# Patient Record
Sex: MALE | Race: WHITE | Employment: FULL TIME | URBAN - NONMETROPOLITAN AREA
[De-identification: names, ages, dates, MRNs, and addresses within clinical notes are randomized per-mention and may not be internally consistent; named-entity substitution may affect disease eponyms.]

---

## 2017-02-06 ENCOUNTER — OFFICE VISIT (OUTPATIENT)
Dept: UROLOGY | Age: 42
End: 2017-02-06

## 2017-02-06 VITALS
SYSTOLIC BLOOD PRESSURE: 110 MMHG | WEIGHT: 255 LBS | HEIGHT: 71 IN | DIASTOLIC BLOOD PRESSURE: 80 MMHG | BODY MASS INDEX: 35.7 KG/M2

## 2017-02-06 DIAGNOSIS — R10.9 FLANK PAIN: ICD-10-CM

## 2017-02-06 DIAGNOSIS — N20.1 URETERAL CALCULI: Primary | ICD-10-CM

## 2017-02-06 LAB
BILIRUBIN URINE: NEGATIVE
BLOOD URINE, POC: NORMAL
CHARACTER, URINE: CLEAR
COLOR, URINE: YELLOW
GLUCOSE URINE: NEGATIVE MG/DL
KETONES, URINE: NEGATIVE
LEUKOCYTE CLUMPS, URINE: NORMAL
NITRITE, URINE: NEGATIVE
PH, URINE: 5.5
PROTEIN, URINE: NEGATIVE MG/DL
SPECIFIC GRAVITY, URINE: 1.01 (ref 1–1.03)
UROBILINOGEN, URINE: 0.2 EU/DL

## 2017-02-06 PROCEDURE — 81003 URINALYSIS AUTO W/O SCOPE: CPT | Performed by: NURSE PRACTITIONER

## 2017-02-06 PROCEDURE — 99204 OFFICE O/P NEW MOD 45 MIN: CPT | Performed by: NURSE PRACTITIONER

## 2017-02-06 RX ORDER — TAMSULOSIN HYDROCHLORIDE 0.4 MG/1
0.4 CAPSULE ORAL 2 TIMES DAILY
Qty: 20 CAPSULE | Refills: 1 | Status: SHIPPED | OUTPATIENT
Start: 2017-02-06 | End: 2017-08-07 | Stop reason: ALTCHOICE

## 2017-02-06 ASSESSMENT — ENCOUNTER SYMPTOMS
CONSTIPATION: 0
SHORTNESS OF BREATH: 0
COLOR CHANGE: 1
EYE ITCHING: 0
BACK PAIN: 0
CHEST TIGHTNESS: 0
EYE REDNESS: 0
ABDOMINAL PAIN: 1

## 2017-02-09 ENCOUNTER — TELEPHONE (OUTPATIENT)
Dept: UROLOGY | Age: 42
End: 2017-02-09

## 2017-02-15 ENCOUNTER — TELEPHONE (OUTPATIENT)
Dept: UROLOGY | Age: 42
End: 2017-02-15

## 2017-02-24 LAB — STONE ANALYSIS: NORMAL

## 2017-04-12 RX ORDER — ESOMEPRAZOLE MAGNESIUM 40 MG/1
40 CAPSULE, DELAYED RELEASE ORAL DAILY
Qty: 30 CAPSULE | Refills: 11 | Status: SHIPPED | OUTPATIENT
Start: 2017-04-12 | End: 2018-05-03 | Stop reason: SDUPTHER

## 2017-08-07 ENCOUNTER — HOSPITAL ENCOUNTER (OUTPATIENT)
Age: 42
Discharge: HOME | End: 2017-08-07
Payer: COMMERCIAL

## 2017-08-07 ENCOUNTER — OFFICE VISIT (OUTPATIENT)
Dept: FAMILY MEDICINE CLINIC | Age: 42
End: 2017-08-07
Payer: COMMERCIAL

## 2017-08-07 VITALS
HEART RATE: 88 BPM | OXYGEN SATURATION: 97 % | TEMPERATURE: 98.5 F | WEIGHT: 269 LBS | RESPIRATION RATE: 20 BRPM | SYSTOLIC BLOOD PRESSURE: 130 MMHG | HEIGHT: 71 IN | DIASTOLIC BLOOD PRESSURE: 76 MMHG | BODY MASS INDEX: 37.66 KG/M2

## 2017-08-07 DIAGNOSIS — R50.9 FEBRILE ILLNESS: ICD-10-CM

## 2017-08-07 DIAGNOSIS — R53.83 FATIGUE, UNSPECIFIED TYPE: Primary | ICD-10-CM

## 2017-08-07 LAB
ALBUMIN SERPL-MCNC: 3.9 G/DL (ref 3.5–5.1)
ALP BLD-CCNC: 113 U/L (ref 38–126)
ALT SERPL-CCNC: 57 U/L (ref 11–66)
AMORPHOUS: ABNORMAL
ANION GAP SERPL CALCULATED.3IONS-SCNC: 14 MEQ/L (ref 8–16)
AST SERPL-CCNC: 45 U/L (ref 5–40)
BACTERIA: ABNORMAL
BASOPHILS # BLD: 1 %
BASOPHILS ABSOLUTE: 0 THOU/MM3 (ref 0–0.1)
BILIRUB SERPL-MCNC: 0.8 MG/DL (ref 0.3–1.2)
BILIRUBIN CONFIRMATION: NEGATIVE
BILIRUBIN URINE: ABNORMAL
BLOOD, URINE: NEGATIVE
BUN BLDV-MCNC: 10 MG/DL (ref 7–22)
CALCIUM SERPL-MCNC: 8.8 MG/DL (ref 8.5–10.5)
CASTS UA: ABNORMAL /LPF
CHARACTER, URINE: CLEAR
CHLORIDE BLD-SCNC: 103 MEQ/L (ref 98–111)
CO2: 26 MEQ/L (ref 23–33)
COLOR: ABNORMAL
CREAT SERPL-MCNC: 0.9 MG/DL (ref 0.4–1.2)
CRYSTALS, UA: ABNORMAL
EOSINOPHIL # BLD: 1.6 %
EOSINOPHILS ABSOLUTE: 0 THOU/MM3 (ref 0–0.4)
EPITHELIAL CELLS, UA: ABNORMAL /HPF
GFR SERPL CREATININE-BSD FRML MDRD: > 90 ML/MIN/1.73M2
GLUCOSE BLD-MCNC: 160 MG/DL (ref 70–108)
GLUCOSE, URINE: NEGATIVE MG/DL
HCT VFR BLD CALC: 41.1 % (ref 42–52)
HEMOGLOBIN: 14.2 GM/DL (ref 14–18)
KETONES, URINE: ABNORMAL
LEUKOCYTE ESTERASE, URINE: NEGATIVE
LYMPHOCYTES # BLD: 23.8 %
LYMPHOCYTES ABSOLUTE: 0.7 THOU/MM3 (ref 1–4.8)
MCH RBC QN AUTO: 28.3 PG (ref 27–31)
MCHC RBC AUTO-ENTMCNC: 34.6 GM/DL (ref 33–37)
MCV RBC AUTO: 81.8 FL (ref 80–94)
MONOCYTES # BLD: 12.4 %
MONOCYTES ABSOLUTE: 0.4 THOU/MM3 (ref 0.4–1.3)
MUCUS: ABNORMAL
NITRITE, URINE: NEGATIVE
NUCLEATED RED BLOOD CELLS: 0 /100 WBC
PDW BLD-RTO: 13.4 % (ref 11.5–14.5)
PH UA: 5.5 (ref 5–9)
PLATELET # BLD: 134 THOU/MM3 (ref 130–400)
PMV BLD AUTO: 9.7 MCM (ref 7.4–10.4)
POTASSIUM SERPL-SCNC: 3.7 MEQ/L (ref 3.5–5.2)
PROTEIN UA: 30 MG/DL
RBC # BLD: 5.02 MILL/MM3 (ref 4.7–6.1)
RBC # BLD: NORMAL 10*6/UL
RBC URINE: ABNORMAL /HPF
SEG NEUTROPHILS: 61.2 %
SEGMENTED NEUTROPHILS ABSOLUTE COUNT: 1.9 THOU/MM3 (ref 1.8–7.7)
SODIUM BLD-SCNC: 143 MEQ/L (ref 135–145)
SPECIFIC GRAVITY UA: >= 1.03 (ref 1–1.03)
TOTAL PROTEIN: 6.1 G/DL (ref 6.1–8)
TSH SERPL DL<=0.05 MIU/L-ACNC: 2.2 UIU/ML (ref 0.4–4.2)
UROBILINOGEN, URINE: 1 EU/DL (ref 0.2–1)
WBC # BLD: 3.1 THOU/MM3 (ref 4.8–10.8)
WBC UA: ABNORMAL /HPF

## 2017-08-07 PROCEDURE — 80050 GENERAL HEALTH PANEL: CPT

## 2017-08-07 PROCEDURE — 81001 URINALYSIS AUTO W/SCOPE: CPT

## 2017-08-07 PROCEDURE — 36415 COLL VENOUS BLD VENIPUNCTURE: CPT

## 2017-08-07 PROCEDURE — 99213 OFFICE O/P EST LOW 20 MIN: CPT | Performed by: NURSE PRACTITIONER

## 2017-08-08 ASSESSMENT — ENCOUNTER SYMPTOMS
CHEST TIGHTNESS: 0
ABDOMINAL PAIN: 0
SHORTNESS OF BREATH: 0
SORE THROAT: 0
COUGH: 0
NAUSEA: 0
WHEEZING: 0
CONSTIPATION: 0
ABDOMINAL DISTENTION: 0
BACK PAIN: 0
STRIDOR: 0
SINUS PRESSURE: 0
VOMITING: 0
COLOR CHANGE: 0
DIARRHEA: 1

## 2017-08-16 ENCOUNTER — OFFICE VISIT (OUTPATIENT)
Dept: FAMILY MEDICINE CLINIC | Age: 42
End: 2017-08-16
Payer: COMMERCIAL

## 2017-08-16 VITALS
WEIGHT: 270.6 LBS | RESPIRATION RATE: 12 BRPM | DIASTOLIC BLOOD PRESSURE: 88 MMHG | TEMPERATURE: 97.7 F | SYSTOLIC BLOOD PRESSURE: 124 MMHG | BODY MASS INDEX: 37.88 KG/M2 | HEIGHT: 71 IN

## 2017-08-16 DIAGNOSIS — L29.9 PRURITIC DERMATITIS: Primary | ICD-10-CM

## 2017-08-16 PROCEDURE — 99213 OFFICE O/P EST LOW 20 MIN: CPT | Performed by: FAMILY MEDICINE

## 2017-08-16 RX ORDER — HYDROXYZINE HYDROCHLORIDE 25 MG/1
25 TABLET, FILM COATED ORAL EVERY 6 HOURS PRN
Qty: 30 TABLET | Refills: 0 | Status: SHIPPED | OUTPATIENT
Start: 2017-08-16 | End: 2017-08-26

## 2017-08-16 RX ORDER — TRIAMCINOLONE ACETONIDE 40 MG/ML
80 INJECTION, SUSPENSION INTRA-ARTICULAR; INTRAMUSCULAR ONCE
Status: COMPLETED | OUTPATIENT
Start: 2017-08-16 | End: 2017-08-16

## 2017-08-16 RX ADMIN — TRIAMCINOLONE ACETONIDE 80 MG: 40 INJECTION, SUSPENSION INTRA-ARTICULAR; INTRAMUSCULAR at 09:49

## 2017-08-16 ASSESSMENT — ENCOUNTER SYMPTOMS
SHORTNESS OF BREATH: 0
RHINORRHEA: 0
ROS SKIN COMMENTS: ITCHING
COUGH: 0
SORE THROAT: 0
WHEEZING: 0
EYE DISCHARGE: 0
CONSTIPATION: 0
NAUSEA: 0
ABDOMINAL PAIN: 0
DIARRHEA: 0

## 2018-01-02 ENCOUNTER — OFFICE VISIT (OUTPATIENT)
Dept: FAMILY MEDICINE CLINIC | Age: 43
End: 2018-01-02
Payer: COMMERCIAL

## 2018-01-02 VITALS
OXYGEN SATURATION: 98 % | HEIGHT: 71 IN | WEIGHT: 276 LBS | SYSTOLIC BLOOD PRESSURE: 124 MMHG | HEART RATE: 78 BPM | RESPIRATION RATE: 20 BRPM | BODY MASS INDEX: 38.64 KG/M2 | DIASTOLIC BLOOD PRESSURE: 88 MMHG

## 2018-01-02 DIAGNOSIS — L30.9 DERMATITIS: Primary | ICD-10-CM

## 2018-01-02 DIAGNOSIS — N52.9 VASCULOGENIC ERECTILE DYSFUNCTION, UNSPECIFIED VASCULOGENIC ERECTILE DYSFUNCTION TYPE: ICD-10-CM

## 2018-01-02 DIAGNOSIS — B07.8 OTHER VIRAL WARTS: ICD-10-CM

## 2018-01-02 PROCEDURE — 99213 OFFICE O/P EST LOW 20 MIN: CPT | Performed by: NURSE PRACTITIONER

## 2018-01-02 RX ORDER — TADALAFIL 20 MG/1
20 TABLET ORAL PRN
Qty: 10 TABLET | Refills: 1 | Status: SHIPPED | OUTPATIENT
Start: 2018-01-02 | End: 2020-05-28

## 2018-01-02 RX ORDER — MOMETASONE FUROATE 1 MG/G
CREAM TOPICAL
Qty: 45 G | Refills: 0 | Status: SHIPPED | OUTPATIENT
Start: 2018-01-02 | End: 2018-12-26

## 2018-01-02 ASSESSMENT — PATIENT HEALTH QUESTIONNAIRE - PHQ9
SUM OF ALL RESPONSES TO PHQ9 QUESTIONS 1 & 2: 0
SUM OF ALL RESPONSES TO PHQ QUESTIONS 1-9: 0
1. LITTLE INTEREST OR PLEASURE IN DOING THINGS: 0
2. FEELING DOWN, DEPRESSED OR HOPELESS: 0

## 2018-01-02 NOTE — PROGRESS NOTES
this patient. Subjective:      Review of Systems   Constitutional: Negative for activity change, appetite change, chills, diaphoresis, fatigue, fever and unexpected weight change. HENT: Negative for congestion, ear pain, postnasal drip, sinus pressure and sore throat. Eyes: Negative for visual disturbance. Respiratory: Negative for cough, chest tightness, shortness of breath, wheezing and stridor. Cardiovascular: Negative for chest pain, palpitations and leg swelling. Gastrointestinal: Negative for abdominal distention, abdominal pain, constipation, diarrhea, nausea and vomiting. Endocrine: Negative for polydipsia, polyphagia and polyuria. Genitourinary: Negative for decreased urine volume, difficulty urinating, dysuria, flank pain, frequency, hematuria and urgency. Musculoskeletal: Negative for arthralgias, back pain, gait problem, joint swelling, myalgias and neck pain. Skin: Negative for color change, pallor and rash. Area of cracked peeling skin and wart. Neurological: Negative for dizziness, syncope, weakness, light-headedness, numbness and headaches. Hematological: Negative for adenopathy. Psychiatric/Behavioral: Negative for behavioral problems, self-injury and sleep disturbance. The patient is not nervous/anxious. Objective:     Physical Exam   Constitutional: He is oriented to person, place, and time. He appears well-developed and well-nourished. HENT:   Head: Normocephalic. Right Ear: Tympanic membrane and external ear normal.   Left Ear: Tympanic membrane and external ear normal.   Nose: Nose normal. Right sinus exhibits no maxillary sinus tenderness. Left sinus exhibits no maxillary sinus tenderness. Mouth/Throat: Uvula is midline, oropharynx is clear and moist and mucous membranes are normal.   Eyes: Pupils are equal, round, and reactive to light. Neck: Trachea normal and normal range of motion. Neck supple. No JVD present. Carotid bruit is not present. placed in this encounter. Orders Placed This Encounter   Medications    mometasone (ELOCON) 0.1 % cream     Sig: Apply topically daily. Dispense:  45 g     Refill:  0    tadalafil (CIALIS) 20 MG tablet     Sig: Take 1 tablet by mouth as needed for Erectile Dysfunction     Dispense:  10 tablet     Refill:  1         Patient given educational materials - see patient instructions. Discussed use, benefit, and side effects of prescribed medications. All patient questions answered. Pt voiced understanding. Reviewed health maintenance. Instructed to continue current medications, diet and exercise. Patient agreed with treatment plan. Follow up as directed.      Electronically signed by Lana Fagan CNP on 1/4/2018 at 7:45 AM

## 2018-01-04 ASSESSMENT — ENCOUNTER SYMPTOMS
COUGH: 0
VOMITING: 0
NAUSEA: 0
SORE THROAT: 0
DIARRHEA: 0
BACK PAIN: 0
SHORTNESS OF BREATH: 0
CONSTIPATION: 0
ABDOMINAL PAIN: 0
SINUS PRESSURE: 0
COLOR CHANGE: 0
ABDOMINAL DISTENTION: 0
STRIDOR: 0
CHEST TIGHTNESS: 0
WHEEZING: 0

## 2018-05-07 RX ORDER — ESOMEPRAZOLE MAGNESIUM 40 MG/1
40 CAPSULE, DELAYED RELEASE ORAL DAILY
Qty: 30 CAPSULE | Refills: 11 | Status: SHIPPED | OUTPATIENT
Start: 2018-05-07 | End: 2019-05-20 | Stop reason: SDUPTHER

## 2018-07-19 ENCOUNTER — OFFICE VISIT (OUTPATIENT)
Dept: FAMILY MEDICINE CLINIC | Age: 43
End: 2018-07-19
Payer: COMMERCIAL

## 2018-07-19 VITALS
TEMPERATURE: 98.2 F | BODY MASS INDEX: 37.38 KG/M2 | OXYGEN SATURATION: 95 % | DIASTOLIC BLOOD PRESSURE: 72 MMHG | WEIGHT: 268 LBS | SYSTOLIC BLOOD PRESSURE: 124 MMHG | RESPIRATION RATE: 16 BRPM | HEART RATE: 74 BPM

## 2018-07-19 DIAGNOSIS — Z13.220 SCREENING FOR LIPID DISORDERS: ICD-10-CM

## 2018-07-19 DIAGNOSIS — Z13.1 ENCOUNTER FOR SCREENING FOR DIABETES MELLITUS: ICD-10-CM

## 2018-07-19 DIAGNOSIS — J01.00 ACUTE NON-RECURRENT MAXILLARY SINUSITIS: Primary | ICD-10-CM

## 2018-07-19 PROCEDURE — 99213 OFFICE O/P EST LOW 20 MIN: CPT | Performed by: NURSE PRACTITIONER

## 2018-07-19 RX ORDER — PREDNISONE 20 MG/1
20 TABLET ORAL DAILY
Qty: 5 TABLET | Refills: 0 | Status: SHIPPED | OUTPATIENT
Start: 2018-07-19 | End: 2018-09-25 | Stop reason: SDUPTHER

## 2018-07-19 ASSESSMENT — ENCOUNTER SYMPTOMS
SORE THROAT: 0
COUGH: 0
SINUS PAIN: 1
CHEST TIGHTNESS: 0
EYE PAIN: 0
ABDOMINAL PAIN: 0
RHINORRHEA: 1
SHORTNESS OF BREATH: 0
VOMITING: 0
DIARRHEA: 0
NAUSEA: 0
EYE DISCHARGE: 0
WHEEZING: 0
SINUS PRESSURE: 1

## 2018-07-19 NOTE — PROGRESS NOTES
[facility]  [address] [place] [address]  Dept: [phone]  Dept Fax: [phone]  Loc: [phone]    Sindi Addison is a 38 y.o. male who presents today for his medical conditions/complaints as noted below. Chief Complaint   Patient presents with    Congestion    Fatigue    Hand Pain     left           HPI:     HPI     Patient being seen in the office today for nasal congestion. He reports that it started last night. He stated that this morning his nose was runny and then congestion returned. He reports sinus pressure, but it is not tender when palpated. He reports that he has a mild off balance sensation. He denies that it is associated with position changes or head movement. He reports some fatigue and generalized aches. He reports that he also has an area on his left palm that has bothered him. He stated that he first noticed it 3 months ago. He reports that the spot is like a small bump. He reports that the area is more tender with it is touched. Past Medical History:   Diagnosis Date    GERD (gastroesophageal reflux disease)     IBS (irritable bowel syndrome)       Past Surgical History:   Procedure Laterality Date    BACK SURGERY         Family History   Problem Relation Age of Onset    High Blood Pressure Mother     High Blood Pressure Brother        Social History   Substance Use Topics    Smoking status: Never Smoker    Smokeless tobacco: Never Used    Alcohol use No      Current Outpatient Prescriptions   Medication Sig Dispense Refill    predniSONE (DELTASONE) 20 MG tablet Take 1 tablet by mouth daily for 5 days 5 tablet 0    esomeprazole (NEXIUM) 40 MG delayed release capsule Take 1 capsule by mouth daily 30 capsule 11    mometasone (ELOCON) 0.1 % cream Apply topically daily.  45 g 0    tadalafil (CIALIS) 20 MG tablet Take 1 tablet by mouth as needed for Erectile Dysfunction 10 tablet 1   

## 2018-09-25 ENCOUNTER — OFFICE VISIT (OUTPATIENT)
Dept: FAMILY MEDICINE CLINIC | Age: 43
End: 2018-09-25
Payer: COMMERCIAL

## 2018-09-25 VITALS
BODY MASS INDEX: 38.36 KG/M2 | HEART RATE: 65 BPM | DIASTOLIC BLOOD PRESSURE: 80 MMHG | OXYGEN SATURATION: 96 % | HEIGHT: 71 IN | RESPIRATION RATE: 20 BRPM | SYSTOLIC BLOOD PRESSURE: 124 MMHG | WEIGHT: 274 LBS

## 2018-09-25 DIAGNOSIS — J01.00 ACUTE NON-RECURRENT MAXILLARY SINUSITIS: Primary | ICD-10-CM

## 2018-09-25 PROCEDURE — 99213 OFFICE O/P EST LOW 20 MIN: CPT | Performed by: NURSE PRACTITIONER

## 2018-09-25 RX ORDER — AZITHROMYCIN 250 MG/1
TABLET, FILM COATED ORAL
Qty: 6 TABLET | Refills: 0 | Status: SHIPPED | OUTPATIENT
Start: 2018-09-25 | End: 2018-10-05

## 2018-09-25 RX ORDER — PREDNISONE 20 MG/1
20 TABLET ORAL DAILY
Qty: 5 TABLET | Refills: 0 | Status: SHIPPED | OUTPATIENT
Start: 2018-09-25 | End: 2018-09-30

## 2018-09-25 ASSESSMENT — ENCOUNTER SYMPTOMS
SINUS PRESSURE: 1
WHEEZING: 0
SORE THROAT: 1
ABDOMINAL PAIN: 0
ABDOMINAL DISTENTION: 0
COLOR CHANGE: 0
NAUSEA: 0
VOMITING: 0
COUGH: 1
DIARRHEA: 0
SINUS PAIN: 1
SHORTNESS OF BREATH: 0

## 2018-09-25 NOTE — PROGRESS NOTES
 Lipid screen  09/25/2013    Diabetes screen  09/25/2013    Flu vaccine (1) 09/01/2018       Subjective:      Review of Systems   Constitutional: Positive for fatigue. Negative for chills and fever. HENT: Positive for congestion, ear pain, postnasal drip, sinus pain, sinus pressure and sore throat. Respiratory: Positive for cough. Negative for shortness of breath and wheezing. Cardiovascular: Negative for chest pain. Gastrointestinal: Negative for abdominal distention, abdominal pain, diarrhea, nausea and vomiting. Skin: Negative for color change and rash. Neurological: Positive for headaches. Negative for dizziness and light-headedness. Objective:     Physical Exam   Constitutional: He is oriented to person, place, and time. He appears well-developed and well-nourished. HENT:   Head: Normocephalic. Right Ear: Tympanic membrane and external ear normal.   Left Ear: Tympanic membrane and external ear normal.   Nose: Right sinus exhibits maxillary sinus tenderness. Right sinus exhibits no frontal sinus tenderness. Left sinus exhibits maxillary sinus tenderness. Left sinus exhibits no frontal sinus tenderness. Mouth/Throat: Uvula is midline, oropharynx is clear and moist and mucous membranes are normal.   Neck: Normal range of motion. Neck supple. Cardiovascular: Normal rate, regular rhythm and normal heart sounds. Pulmonary/Chest: Effort normal and breath sounds normal.   Abdominal: Soft. There is no tenderness. Lymphadenopathy:     He has no cervical adenopathy. Neurological: He is alert and oriented to person, place, and time. He exhibits normal muscle tone. Coordination normal.   Skin: Skin is warm and dry. /80   Pulse 65   Resp 20   Ht 5' 11\" (1.803 m)   Wt 274 lb (124.3 kg)   SpO2 96%   BMI 38.22 kg/m²     Assessment:       Diagnosis Orders   1.  Acute non-recurrent maxillary sinusitis         Plan:       Discussed use, benefit, and side effects of prescribed

## 2018-12-26 ENCOUNTER — OFFICE VISIT (OUTPATIENT)
Dept: FAMILY MEDICINE CLINIC | Age: 43
End: 2018-12-26
Payer: COMMERCIAL

## 2018-12-26 VITALS
HEART RATE: 110 BPM | OXYGEN SATURATION: 96 % | HEIGHT: 71 IN | WEIGHT: 280.6 LBS | BODY MASS INDEX: 39.28 KG/M2 | DIASTOLIC BLOOD PRESSURE: 88 MMHG | TEMPERATURE: 98.6 F | RESPIRATION RATE: 18 BRPM | SYSTOLIC BLOOD PRESSURE: 126 MMHG

## 2018-12-26 DIAGNOSIS — J01.00 ACUTE NON-RECURRENT MAXILLARY SINUSITIS: Primary | ICD-10-CM

## 2018-12-26 PROCEDURE — 99213 OFFICE O/P EST LOW 20 MIN: CPT | Performed by: NURSE PRACTITIONER

## 2018-12-26 RX ORDER — DOXYCYCLINE HYCLATE 100 MG
100 TABLET ORAL 2 TIMES DAILY
Qty: 20 TABLET | Refills: 0 | Status: SHIPPED | OUTPATIENT
Start: 2018-12-26 | End: 2019-01-05

## 2018-12-30 ASSESSMENT — ENCOUNTER SYMPTOMS
SINUS PRESSURE: 1
VOMITING: 0
SHORTNESS OF BREATH: 0
WHEEZING: 0
SORE THROAT: 1
DIARRHEA: 0
COLOR CHANGE: 0
NAUSEA: 0
ABDOMINAL PAIN: 0
COUGH: 1

## 2019-03-07 ENCOUNTER — OFFICE VISIT (OUTPATIENT)
Dept: FAMILY MEDICINE CLINIC | Age: 44
End: 2019-03-07
Payer: COMMERCIAL

## 2019-03-07 VITALS
HEART RATE: 89 BPM | SYSTOLIC BLOOD PRESSURE: 122 MMHG | BODY MASS INDEX: 38.64 KG/M2 | OXYGEN SATURATION: 98 % | HEIGHT: 71 IN | DIASTOLIC BLOOD PRESSURE: 84 MMHG | WEIGHT: 276 LBS | RESPIRATION RATE: 20 BRPM

## 2019-03-07 DIAGNOSIS — Z13.220 SCREENING FOR LIPID DISORDERS: ICD-10-CM

## 2019-03-07 DIAGNOSIS — R06.83 SNORES: Primary | ICD-10-CM

## 2019-03-07 DIAGNOSIS — N52.9 ERECTILE DYSFUNCTION, UNSPECIFIED ERECTILE DYSFUNCTION TYPE: ICD-10-CM

## 2019-03-07 DIAGNOSIS — Z91.09 ENVIRONMENTAL ALLERGIES: ICD-10-CM

## 2019-03-07 DIAGNOSIS — Z13.1 SCREENING FOR DIABETES MELLITUS: ICD-10-CM

## 2019-03-07 PROCEDURE — 99214 OFFICE O/P EST MOD 30 MIN: CPT | Performed by: NURSE PRACTITIONER

## 2019-03-07 RX ORDER — LORATADINE 10 MG/1
10 TABLET ORAL DAILY PRN
Qty: 30 TABLET | Refills: 5 | Status: SHIPPED | OUTPATIENT
Start: 2019-03-07 | End: 2022-02-24

## 2019-03-07 RX ORDER — TADALAFIL 20 MG/1
20 TABLET ORAL PRN
Qty: 10 TABLET | Refills: 1 | Status: CANCELLED | OUTPATIENT
Start: 2019-03-07

## 2019-03-07 RX ORDER — VARDENAFIL HYDROCHLORIDE 20 MG/1
20 TABLET ORAL PRN
Qty: 10 TABLET | Refills: 3 | Status: SHIPPED | OUTPATIENT
Start: 2019-03-07 | End: 2020-05-28 | Stop reason: SDUPTHER

## 2019-03-07 ASSESSMENT — ENCOUNTER SYMPTOMS
SHORTNESS OF BREATH: 0
NAUSEA: 0
CHEST TIGHTNESS: 0
COLOR CHANGE: 0
BACK PAIN: 0
WHEEZING: 0
COUGH: 0
DIARRHEA: 0
SINUS PRESSURE: 0
ABDOMINAL PAIN: 0
SORE THROAT: 0
VOMITING: 0
CONSTIPATION: 0
STRIDOR: 0
ABDOMINAL DISTENTION: 0

## 2019-03-07 ASSESSMENT — PATIENT HEALTH QUESTIONNAIRE - PHQ9
SUM OF ALL RESPONSES TO PHQ QUESTIONS 1-9: 0
1. LITTLE INTEREST OR PLEASURE IN DOING THINGS: 0
SUM OF ALL RESPONSES TO PHQ QUESTIONS 1-9: 0
2. FEELING DOWN, DEPRESSED OR HOPELESS: 0
SUM OF ALL RESPONSES TO PHQ9 QUESTIONS 1 & 2: 0

## 2019-05-03 ENCOUNTER — TELEPHONE (OUTPATIENT)
Dept: PULMONOLOGY | Age: 44
End: 2019-05-03

## 2019-05-20 RX ORDER — ESOMEPRAZOLE MAGNESIUM 40 MG/1
40 CAPSULE, DELAYED RELEASE ORAL DAILY
Qty: 30 CAPSULE | Refills: 11 | Status: SHIPPED | OUTPATIENT
Start: 2019-05-20 | End: 2020-05-28

## 2019-05-20 NOTE — TELEPHONE ENCOUNTER
We received a refill request from [facility] pharmacy for a refill on medication.

## 2019-12-04 ENCOUNTER — HOSPITAL ENCOUNTER (EMERGENCY)
Age: 44
Discharge: HOME | End: 2019-12-04
Attending: FAMILY MEDICINE
Payer: COMMERCIAL

## 2019-12-04 VITALS
OXYGEN SATURATION: 95 % | TEMPERATURE: 98.4 F | HEIGHT: 71 IN | DIASTOLIC BLOOD PRESSURE: 87 MMHG | BODY MASS INDEX: 38.5 KG/M2 | RESPIRATION RATE: 18 BRPM | HEART RATE: 88 BPM | SYSTOLIC BLOOD PRESSURE: 134 MMHG | WEIGHT: 275 LBS

## 2019-12-04 DIAGNOSIS — J01.00 ACUTE NON-RECURRENT MAXILLARY SINUSITIS: Primary | ICD-10-CM

## 2019-12-04 PROCEDURE — 99283 EMERGENCY DEPT VISIT LOW MDM: CPT

## 2019-12-04 RX ORDER — DOXYCYCLINE 100 MG/1
100 TABLET ORAL 2 TIMES DAILY
Qty: 20 TABLET | Refills: 0 | Status: SHIPPED | OUTPATIENT
Start: 2019-12-04 | End: 2019-12-14

## 2019-12-04 RX ORDER — FLUTICASONE PROPIONATE 50 MCG
1 SPRAY, SUSPENSION (ML) NASAL DAILY
Qty: 1 BOTTLE | Refills: 0 | Status: SHIPPED | OUTPATIENT
Start: 2019-12-04 | End: 2022-06-15

## 2019-12-04 ASSESSMENT — ENCOUNTER SYMPTOMS
SINUS PAIN: 1
NAUSEA: 0
EYE DISCHARGE: 0
VOMITING: 0
COUGH: 1
SHORTNESS OF BREATH: 0
EYE REDNESS: 0
SINUS PRESSURE: 1

## 2020-05-28 ENCOUNTER — TELEPHONE (OUTPATIENT)
Dept: ADMINISTRATIVE | Age: 45
End: 2020-05-28

## 2020-05-28 ENCOUNTER — OFFICE VISIT (OUTPATIENT)
Dept: FAMILY MEDICINE CLINIC | Age: 45
End: 2020-05-28
Payer: COMMERCIAL

## 2020-05-28 VITALS
RESPIRATION RATE: 18 BRPM | OXYGEN SATURATION: 97 % | SYSTOLIC BLOOD PRESSURE: 124 MMHG | HEART RATE: 72 BPM | DIASTOLIC BLOOD PRESSURE: 80 MMHG | BODY MASS INDEX: 36.65 KG/M2 | WEIGHT: 262.8 LBS | TEMPERATURE: 96.8 F

## 2020-05-28 PROCEDURE — 99214 OFFICE O/P EST MOD 30 MIN: CPT | Performed by: NURSE PRACTITIONER

## 2020-05-28 RX ORDER — SILDENAFIL 100 MG/1
100 TABLET, FILM COATED ORAL PRN
Qty: 10 TABLET | Refills: 5 | Status: SHIPPED | OUTPATIENT
Start: 2020-05-28 | End: 2022-04-06 | Stop reason: SDUPTHER

## 2020-05-28 RX ORDER — TADALAFIL 20 MG/1
20 TABLET ORAL PRN
Qty: 10 TABLET | Refills: 1 | Status: CANCELLED | OUTPATIENT
Start: 2020-05-28

## 2020-05-28 RX ORDER — OMEPRAZOLE 40 MG/1
40 CAPSULE, DELAYED RELEASE ORAL
Qty: 30 CAPSULE | Refills: 11 | Status: SHIPPED | OUTPATIENT
Start: 2020-05-28 | End: 2021-06-23 | Stop reason: SDUPTHER

## 2020-05-28 RX ORDER — VARDENAFIL HYDROCHLORIDE 20 MG/1
20 TABLET ORAL PRN
Qty: 10 TABLET | Refills: 3 | Status: SHIPPED | OUTPATIENT
Start: 2020-05-28 | End: 2021-05-04

## 2020-05-28 SDOH — ECONOMIC STABILITY: INCOME INSECURITY: HOW HARD IS IT FOR YOU TO PAY FOR THE VERY BASICS LIKE FOOD, HOUSING, MEDICAL CARE, AND HEATING?: NOT HARD AT ALL

## 2020-05-28 SDOH — ECONOMIC STABILITY: FOOD INSECURITY: WITHIN THE PAST 12 MONTHS, YOU WORRIED THAT YOUR FOOD WOULD RUN OUT BEFORE YOU GOT MONEY TO BUY MORE.: NEVER TRUE

## 2020-05-28 SDOH — ECONOMIC STABILITY: TRANSPORTATION INSECURITY
IN THE PAST 12 MONTHS, HAS THE LACK OF TRANSPORTATION KEPT YOU FROM MEDICAL APPOINTMENTS OR FROM GETTING MEDICATIONS?: NO

## 2020-05-28 SDOH — ECONOMIC STABILITY: FOOD INSECURITY: WITHIN THE PAST 12 MONTHS, THE FOOD YOU BOUGHT JUST DIDN'T LAST AND YOU DIDN'T HAVE MONEY TO GET MORE.: NEVER TRUE

## 2020-05-28 SDOH — ECONOMIC STABILITY: TRANSPORTATION INSECURITY
IN THE PAST 12 MONTHS, HAS LACK OF TRANSPORTATION KEPT YOU FROM MEETINGS, WORK, OR FROM GETTING THINGS NEEDED FOR DAILY LIVING?: NO

## 2020-05-28 ASSESSMENT — PATIENT HEALTH QUESTIONNAIRE - PHQ9
2. FEELING DOWN, DEPRESSED OR HOPELESS: 0
SUM OF ALL RESPONSES TO PHQ QUESTIONS 1-9: 0
1. LITTLE INTEREST OR PLEASURE IN DOING THINGS: 0
SUM OF ALL RESPONSES TO PHQ QUESTIONS 1-9: 0
SUM OF ALL RESPONSES TO PHQ9 QUESTIONS 1 & 2: 0

## 2020-05-28 NOTE — PROGRESS NOTES
Negative for color change, pallor and rash. Neurological: Negative for dizziness, syncope, weakness, light-headedness, numbness and headaches. Hematological: Negative for adenopathy. Psychiatric/Behavioral: Negative for behavioral problems, self-injury and sleep disturbance. The patient is not nervous/anxious. Objective:     Physical Exam  Vitals signs and nursing note reviewed. Constitutional:       Appearance: Normal appearance. He is well-developed. HENT:      Head: Normocephalic. Right Ear: Tympanic membrane and external ear normal.      Left Ear: Tympanic membrane and external ear normal.      Nose: Nose normal.      Right Sinus: No maxillary sinus tenderness. Left Sinus: No maxillary sinus tenderness. Mouth/Throat:      Pharynx: Uvula midline. Eyes:      Pupils: Pupils are equal, round, and reactive to light. Neck:      Musculoskeletal: Normal range of motion and neck supple. Thyroid: No thyromegaly. Vascular: No carotid bruit or JVD. Trachea: Trachea normal.   Cardiovascular:      Rate and Rhythm: Normal rate and regular rhythm. Heart sounds: Normal heart sounds. No murmur. Pulmonary:      Effort: Pulmonary effort is normal. No respiratory distress. Breath sounds: Normal breath sounds. No decreased breath sounds, wheezing, rhonchi or rales. Chest:      Chest wall: No tenderness. Abdominal:      General: Bowel sounds are normal. There is no distension. Palpations: Abdomen is soft. There is no mass. Tenderness: There is no abdominal tenderness. Musculoskeletal: Normal range of motion. General: No tenderness or deformity. Lymphadenopathy:      Cervical: No cervical adenopathy. Skin:     General: Skin is warm and dry. Findings: No erythema or rash. Neurological:      Mental Status: He is alert and oriented to person, place, and time. Cranial Nerves: No cranial nerve deficit. Sensory: No sensory deficit. directed.      Electronically signed by CHYNA Mo CNP on 5/29/2020 at 8:18 AM

## 2020-05-28 NOTE — TELEPHONE ENCOUNTER
Anita Elisa called from [facility] is over 600. Pt is asking that it be changed if possible. Sildenafil is a lot less. Can you change script to that? The Levitra will need to be ordered and Sildenafil they are able to fill today.

## 2020-05-29 ASSESSMENT — ENCOUNTER SYMPTOMS
ABDOMINAL DISTENTION: 0
SHORTNESS OF BREATH: 0
SORE THROAT: 0
SINUS PRESSURE: 0
ABDOMINAL PAIN: 0
WHEEZING: 0
CONSTIPATION: 0
COUGH: 0
STRIDOR: 0
COLOR CHANGE: 0
BACK PAIN: 0
VOMITING: 0
NAUSEA: 0
DIARRHEA: 0
CHEST TIGHTNESS: 0

## 2020-08-10 ENCOUNTER — TELEPHONE (OUTPATIENT)
Dept: FAMILY MEDICINE CLINIC | Age: 45
End: 2020-08-10

## 2020-08-10 RX ORDER — CLINDAMYCIN HYDROCHLORIDE 300 MG/1
300 CAPSULE ORAL 3 TIMES DAILY
Qty: 30 CAPSULE | Refills: 0 | Status: SHIPPED | OUTPATIENT
Start: 2020-08-10 | End: 2020-08-20

## 2020-08-10 NOTE — TELEPHONE ENCOUNTER
DOLV 5/28/20  Pt has an infected tooth. Has a dentist appt 8/18. They don't prescribe antibiotics, and recommended that he contact his PCP. [facility]ses [facility] \[facility]\"" V  Call [phone] with response, ok to CLAUDIA.

## 2020-12-03 ENCOUNTER — TELEMEDICINE (OUTPATIENT)
Dept: FAMILY MEDICINE CLINIC | Age: 45
End: 2020-12-03
Payer: COMMERCIAL

## 2020-12-03 PROCEDURE — 99213 OFFICE O/P EST LOW 20 MIN: CPT | Performed by: NURSE PRACTITIONER

## 2020-12-03 RX ORDER — FLUCONAZOLE 150 MG/1
150 TABLET ORAL DAILY
Qty: 3 TABLET | Refills: 0 | Status: SHIPPED | OUTPATIENT
Start: 2020-12-03 | End: 2020-12-06

## 2020-12-03 RX ORDER — NYSTATIN 100000 U/G
OINTMENT TOPICAL
Qty: 30 G | Refills: 1 | Status: SHIPPED | OUTPATIENT
Start: 2020-12-03 | End: 2021-05-04

## 2020-12-03 ASSESSMENT — ENCOUNTER SYMPTOMS
WHEEZING: 0
VOMITING: 0
ABDOMINAL DISTENTION: 0
ABDOMINAL PAIN: 0
COLOR CHANGE: 0
COUGH: 0
BACK PAIN: 0
STRIDOR: 0
NAUSEA: 0
SHORTNESS OF BREATH: 0
SINUS PRESSURE: 0
SORE THROAT: 0
CONSTIPATION: 0
CHEST TIGHTNESS: 0
DIARRHEA: 0

## 2020-12-03 NOTE — PROGRESS NOTES
Samuel Birch is a 50 y.o. male that presents for Other (penis pain)      This visit was performed via synchronous telecommunication system. Patient is located in their home  I am located in my office at [address] [place] [address]. HPI:    Head of penis has skin cracking and sometime a little painful. Is circumcised. Has used otc antifungal.   Antifungals would help for a little while then it would come back. Penis head is irritated. One partner. I have reviewed the patient's past medical history, past surgical history, allergies,medications, social and family history and I have made updates where appropriate. Past Medical History:   Diagnosis Date    GERD (gastroesophageal reflux disease)     IBS (irritable bowel syndrome)        Past Surgical History:   Procedure Laterality Date    BACK SURGERY         Social History     Tobacco Use    Smoking status: Never Smoker    Smokeless tobacco: Never Used   Substance Use Topics    Alcohol use: No    Drug use: Never       Family History   Problem Relation Age of Onset    High Blood Pressure Mother     High Blood Pressure Brother          Review of Systems   Constitutional: Negative for activity change, appetite change, chills, diaphoresis, fatigue, fever and unexpected weight change. HENT: Negative for congestion, ear pain, postnasal drip, sinus pressure and sore throat. Eyes: Negative for visual disturbance. Respiratory: Negative for cough, chest tightness, shortness of breath, wheezing and stridor. Cardiovascular: Negative for chest pain, palpitations and leg swelling. Gastrointestinal: Negative for abdominal distention, abdominal pain, constipation, diarrhea, nausea and vomiting. Endocrine: Negative for polydipsia, polyphagia and polyuria. Genitourinary: Negative for decreased urine volume, difficulty urinating, dysuria, flank pain, frequency, hematuria and urgency.    Musculoskeletal: Negative for arthralgias, back pain, gait problem, joint swelling, myalgias and neck pain. Skin: Positive for rash. Negative for color change and pallor. Neurological: Negative for dizziness, syncope, weakness, light-headedness, numbness and headaches. Hematological: Negative for adenopathy. Psychiatric/Behavioral: Negative for behavioral problems, self-injury and sleep disturbance. The patient is not nervous/anxious. PHYSICAL EXAM:    Physical Exam  Constitutional:       General: He is not in acute distress. Appearance: Normal appearance. HENT:      Head: Normocephalic and atraumatic. Right Ear: External ear normal.      Left Ear: External ear normal.   Eyes:      General:         Right eye: No discharge. Left eye: No discharge. Pulmonary:      Effort: Pulmonary effort is normal. No respiratory distress. Skin:     Coloration: Skin is not pale. Neurological:      Mental Status: He is alert and oriented to person, place, and time. Psychiatric:         Mood and Affect: Mood normal.         Behavior: Behavior normal.         Thought Content: Thought content normal.         Judgment: Judgment normal.          ASSESSMENT & PLAN  Nicole Hazel was seen today for other. Diagnoses and all orders for this visit:    Balanitis    Other orders  -     fluconazole (DIFLUCAN) 150 MG tablet; Take 1 tablet by mouth daily for 3 doses  -     nystatin (MYCOSTATIN) 573920 UNIT/GM ointment; Apply topically 2 times daily. meds as prescribed  Keep area clean    Return if symptoms worsen or fail to improve. I spent greater than 15 minutes face-to-face virtual visit with patient reviewing past medical history, reviewing lab work, educating, explaining importance of medication adherence and health maintenance recommendations. Of that 15 minutes I spent 10 minutes on counseling and/or coordination of care. Reviewed prior labs and health maintenance. Discussed use, benefit, and side effects of prescribed medications.

## 2021-02-25 ENCOUNTER — VIRTUAL VISIT (OUTPATIENT)
Dept: FAMILY MEDICINE CLINIC | Age: 46
End: 2021-02-25
Payer: COMMERCIAL

## 2021-02-25 DIAGNOSIS — J01.00 ACUTE NON-RECURRENT MAXILLARY SINUSITIS: Primary | ICD-10-CM

## 2021-02-25 PROCEDURE — 99213 OFFICE O/P EST LOW 20 MIN: CPT | Performed by: NURSE PRACTITIONER

## 2021-02-25 RX ORDER — METHYLPREDNISOLONE 4 MG/1
TABLET ORAL
Qty: 1 KIT | Refills: 0 | Status: SHIPPED | OUTPATIENT
Start: 2021-02-25 | End: 2021-03-03

## 2021-02-25 RX ORDER — DOXYCYCLINE HYCLATE 100 MG
100 TABLET ORAL 2 TIMES DAILY
Qty: 20 TABLET | Refills: 0 | Status: SHIPPED | OUTPATIENT
Start: 2021-02-25 | End: 2021-03-07

## 2021-02-25 ASSESSMENT — ENCOUNTER SYMPTOMS
VOMITING: 0
WHEEZING: 0
SORE THROAT: 1
DIARRHEA: 0
SHORTNESS OF BREATH: 0
NAUSEA: 0
ABDOMINAL PAIN: 0
COUGH: 1
COLOR CHANGE: 0
SINUS PRESSURE: 1

## 2021-02-25 NOTE — PROGRESS NOTES
On this date 02/25/21 I have spent 20 minutes reviewing previous notes, test results and face to face (virtual) with the patient discussing the diagnosis and importance of compliance with the treatment plan as well as documenting on the day of the visit. Maribell Roberson is a 50 y.o. male being evaluated by a Virtual Visit (video visit) encounter to address concerns as mentioned above. A caregiver was present when appropriate. Due to this being a TeleHealth encounter (During [place] [address] public health emergency), evaluation of the following organ systems was limited: Vitals/Constitutional/EENT/Resp/CV/GI//MS/Neuro/Skin/Heme-Lymph-Imm. Pursuant to the emergency declaration under the [address] [place] [address], [address] [place] [address] authority and the [employer] Act, this Virtual Visit was conducted with patient's (and/or legal guardian's) consent, to reduce the patient's risk of exposure to COVID-19 and provide necessary medical care. The patient (and/or legal guardian) has also been advised to contact this office for worsening conditions or problems, and seek emergency medical treatment and/or call 911 if deemed necessary. Patient identification was verified at the start of the visit: Yes    Services were provided through a video synchronous discussion virtually to substitute for in-person clinic visit. Patient was located at home and provider was located in office or at home. An electronic signature was used to authenticate this note.     --Aspen Edward, APRN - CNP

## 2021-03-23 ENCOUNTER — TELEPHONE (OUTPATIENT)
Dept: FAMILY MEDICINE CLINIC | Age: 46
End: 2021-03-23

## 2021-03-23 NOTE — LETTER
[facility]  [address] [place] [address]  Phone: [phone]  Fax: [phone]    Peggyann Cushing, APRN - CNP        March 23, 2021     Patient: Luis Manuel Gould   YOB: 1975           To Whom it May Concern:     Óscar Nava has been treated in our office. Please excuse him from work on 03/21/2021. If you have any questions or concerns, please don't hesitate to call.     Sincerely,         Peggyann Cushing, APRN - CNP

## 2021-03-23 NOTE — TELEPHONE ENCOUNTER
Pt called in and staated that he had to take off work due to having issues with is acid reflux, he is wanting a note to excuse him stated PCP is aware of the issue please follow up

## 2021-03-23 NOTE — TELEPHONE ENCOUNTER
He can have a note but I would recommend he come in to discuss some other options for this problem since it is causing him to miss work.

## 2021-03-23 NOTE — TELEPHONE ENCOUNTER
Note printed and placed at the . Patient will either stop and pick it up tomorrow or call with a fax number.

## 2021-03-23 NOTE — TELEPHONE ENCOUNTER
Last time we seen patient for this problem was 05/28/2020. His last visit with us was Virtual on 02/25/2021. Please advise.

## 2021-05-03 ENCOUNTER — TELEPHONE (OUTPATIENT)
Dept: FAMILY MEDICINE CLINIC | Age: 46
End: 2021-05-03

## 2021-05-03 ENCOUNTER — NURSE TRIAGE (OUTPATIENT)
Dept: OTHER | Facility: CLINIC | Age: 46
End: 2021-05-03

## 2021-05-03 NOTE — TELEPHONE ENCOUNTER
makes the hip pain worse? \" (e.g., walking, climbing stairs, running)      Lying on it and climbing stairs    8. OTHER SYMPTOMS: \"Do you have any other symptoms? \" (e.g., back pain, pain shooting down leg,  fever, rash)      No    Protocols used: HIP PAIN-ADULT-[place]

## 2021-05-04 ENCOUNTER — OFFICE VISIT (OUTPATIENT)
Dept: FAMILY MEDICINE CLINIC | Age: 46
End: 2021-05-04
Payer: COMMERCIAL

## 2021-05-04 VITALS
OXYGEN SATURATION: 94 % | DIASTOLIC BLOOD PRESSURE: 82 MMHG | HEIGHT: 71 IN | BODY MASS INDEX: 35.59 KG/M2 | RESPIRATION RATE: 18 BRPM | TEMPERATURE: 97.3 F | WEIGHT: 254.2 LBS | SYSTOLIC BLOOD PRESSURE: 128 MMHG | HEART RATE: 77 BPM

## 2021-05-04 DIAGNOSIS — M25.551 RIGHT HIP PAIN: Primary | ICD-10-CM

## 2021-05-04 DIAGNOSIS — M77.9 TENDONITIS: ICD-10-CM

## 2021-05-04 PROCEDURE — 99213 OFFICE O/P EST LOW 20 MIN: CPT | Performed by: NURSE PRACTITIONER

## 2021-05-04 PROCEDURE — 96372 THER/PROPH/DIAG INJ SC/IM: CPT | Performed by: NURSE PRACTITIONER

## 2021-05-04 RX ORDER — TRIAMCINOLONE ACETONIDE 40 MG/ML
60 INJECTION, SUSPENSION INTRA-ARTICULAR; INTRAMUSCULAR ONCE
Status: COMPLETED | OUTPATIENT
Start: 2021-05-04 | End: 2021-05-04

## 2021-05-04 RX ADMIN — TRIAMCINOLONE ACETONIDE 60 MG: 40 INJECTION, SUSPENSION INTRA-ARTICULAR; INTRAMUSCULAR at 15:10

## 2021-05-04 ASSESSMENT — PATIENT HEALTH QUESTIONNAIRE - PHQ9
SUM OF ALL RESPONSES TO PHQ QUESTIONS 1-9: 0
1. LITTLE INTEREST OR PLEASURE IN DOING THINGS: 0

## 2021-05-04 NOTE — PROGRESS NOTES
Jd Kenny (:  1975) is a 50 y.o. male,Established patient, here for evaluation of the following chief complaint(s):  Hip Pain (right hip started last tuesday night no injury)      ASSESSMENT/PLAN:  1. Right hip pain  -     XR HIP RIGHT (2-3 VIEWS); Future  -     triamcinolone acetonide (KENALOG-40) injection 60 mg; 60 mg, Intramuscular, ONCE, e 21 at 1515, For 1 dose  2. Tendonitis    Kenalog injection given  Tylenol otc for pain  Obtain xray    Return if symptoms worsen or fail to improve. SUBJECTIVE/OBJECTIVE:  HPI    Right hip pain. Started last week. Hurts to walk. No injury. No swelling or bruising. Did do lots of standing and fishing last week on a boat. Review of Systems   Constitutional: Negative for activity change, appetite change, chills, diaphoresis, fatigue, fever and unexpected weight change. HENT: Negative for congestion, ear pain, postnasal drip, sinus pressure and sore throat. Eyes: Negative for visual disturbance. Respiratory: Negative for cough, chest tightness, shortness of breath, wheezing and stridor. Cardiovascular: Negative for chest pain, palpitations and leg swelling. Gastrointestinal: Negative for abdominal distention, abdominal pain, constipation, diarrhea, nausea and vomiting. Endocrine: Negative for polydipsia, polyphagia and polyuria. Genitourinary: Negative for decreased urine volume, difficulty urinating, dysuria, flank pain, frequency, hematuria and urgency. Musculoskeletal: Positive for arthralgias. Negative for back pain, gait problem, joint swelling, myalgias and neck pain. Skin: Negative for color change, pallor and rash. Neurological: Negative for dizziness, syncope, weakness, light-headedness, numbness and headaches. Hematological: Negative for adenopathy. Psychiatric/Behavioral: Negative for behavioral problems, self-injury and sleep disturbance. The patient is not nervous/anxious.         Physical Exam

## 2021-05-04 NOTE — PROGRESS NOTES
After obtaining consent, and per orders of MARISOL Altman injection given by Yousif Kolb. Patient instructed to remain in clinic for 20 minutes afterwards, and to report any adverse reaction to me immediately. Administrations This Visit     triamcinolone acetonide (KENALOG-40) injection 60 mg     Admin Date  05/04/2021  15:10 Action  Given Dose  60 mg Route  Intramuscular Site  Dorsogluteal Right Administered By  Yousif Kolb MA    Ordering Provider: CHYNA Taylor CNP    NDC: 9081-5074-76    Lot#: [id number]    : [employer] (PRIMARY CARE)    Patient Supplied?: No                Patient tolerated well.

## 2021-05-08 ASSESSMENT — ENCOUNTER SYMPTOMS
ABDOMINAL DISTENTION: 0
SINUS PRESSURE: 0
DIARRHEA: 0
SORE THROAT: 0
CONSTIPATION: 0
NAUSEA: 0
SHORTNESS OF BREATH: 0
COUGH: 0
VOMITING: 0
BACK PAIN: 0
COLOR CHANGE: 0
CHEST TIGHTNESS: 0
ABDOMINAL PAIN: 0
STRIDOR: 0
WHEEZING: 0

## 2021-05-25 ENCOUNTER — HOSPITAL ENCOUNTER (EMERGENCY)
Age: 46
Discharge: HOME | End: 2021-05-25
Attending: EMERGENCY MEDICINE
Payer: COMMERCIAL

## 2021-05-25 VITALS
TEMPERATURE: 98.6 F | SYSTOLIC BLOOD PRESSURE: 129 MMHG | RESPIRATION RATE: 18 BRPM | OXYGEN SATURATION: 98 % | BODY MASS INDEX: 34.02 KG/M2 | DIASTOLIC BLOOD PRESSURE: 87 MMHG | HEIGHT: 71 IN | WEIGHT: 243 LBS | HEART RATE: 60 BPM

## 2021-05-25 DIAGNOSIS — A09 ACUTE INFECTIOUS NONBACTERIAL GASTROENTERITIS: Primary | ICD-10-CM

## 2021-05-25 PROCEDURE — 99213 OFFICE O/P EST LOW 20 MIN: CPT

## 2021-05-25 PROCEDURE — 99213 OFFICE O/P EST LOW 20 MIN: CPT | Performed by: EMERGENCY MEDICINE

## 2021-05-25 RX ORDER — LOPERAMIDE HYDROCHLORIDE 2 MG/1
2 CAPSULE ORAL 4 TIMES DAILY PRN
COMMUNITY

## 2021-05-25 ASSESSMENT — ENCOUNTER SYMPTOMS
VOICE CHANGE: 0
SINUS PRESSURE: 0
BACK PAIN: 0
EYE DISCHARGE: 0
ABDOMINAL PAIN: 1
COUGH: 0
NAUSEA: 0
TROUBLE SWALLOWING: 0
STRIDOR: 0
SORE THROAT: 0
EYE REDNESS: 0
SHORTNESS OF BREATH: 0
DIARRHEA: 1
EYE PAIN: 0
VOMITING: 0
WHEEZING: 0

## 2021-05-25 NOTE — LETTER
[facility]  [address] [place] [id number]  Phone: [phone]               May 25, 2021    Patient: Lynn Childs   YOB: 1975   Date of Visit: 5/25/2021       To Whom It May Concern:    Nakia Gasca was seen and treated in our emergency department on 5/25/2021. He may return to work on May 27, 2021.   No work May 25 and May 26, 2021      Sincerely,       Dalila Nice MD         Signature:__________________________________

## 2021-05-25 NOTE — ED TRIAGE NOTES
Patient to room with c/o diarrhea beginning yesterday. States decrease in diarrhea today. Continues to drink oral fluids. Requests work excuse.

## 2021-05-25 NOTE — ED PROVIDER NOTES
[facility] Encounter      [facility]       Chief Complaint   Patient presents with    Diarrhea       Nurses Notes reviewed and I agree except as noted in the HPI. HISTORY OF PRESENT ILLNESS   Mary Herring is a 50 y.o. male who presents 24-hour history of nonbloody diarrhea and cramping abdominal pain. Abdominal pain has resolved. Concerned he may have eaten tainted food. No illness at home. He has nausea but keeping clear liquids down with decreased appetite. No chest pain, shortness of breath, fever, vomiting, dizziness, syncope, rash,  symptoms. History of reflux. No diverticulitis. Non-smoker. REVIEW OF SYSTEMS     Review of Systems   Constitutional: Positive for appetite change. Negative for chills, fatigue, fever and unexpected weight change. HENT: Negative for congestion, ear discharge, ear pain, sinus pressure, sneezing, sore throat, trouble swallowing and voice change. Eyes: Negative for pain, discharge and redness. Respiratory: Negative for cough, shortness of breath, wheezing and stridor. Cardiovascular: Negative for chest pain and leg swelling. Gastrointestinal: Positive for abdominal pain and diarrhea. Negative for nausea and vomiting. Genitourinary: Negative for dysuria, frequency, hematuria and urgency. Musculoskeletal: Negative for arthralgias, back pain, myalgias and neck pain. Skin: Negative for rash. Neurological: Negative for dizziness, syncope, weakness and headaches. Hematological: Negative for adenopathy. Psychiatric/Behavioral: Negative for behavioral problems, confusion, sleep disturbance and suicidal ideas. The patient is not nervous/anxious. All other systems reviewed and are negative.       PAST MEDICAL HISTORY         Diagnosis Date    GERD (gastroesophageal reflux disease)     IBS (irritable bowel syndrome)        SURGICAL HISTORY     Patient  has a past surgical history that includes back surgery. CURRENT MEDICATIONS       Discharge Medication List as of 5/25/2021  8:34 AM      CONTINUE these medications which have NOT CHANGED    Details   loperamide (IMODIUM) 2 MG capsule Take 2 mg by mouth 4 times daily as needed for DiarrheaHistorical Med      omeprazole (PRILOSEC) 40 MG delayed release capsule Take 1 capsule by mouth daily (with breakfast), Disp-30 capsule, R-11Normal      sildenafil (VIAGRA) 100 MG tablet Take 1 tablet by mouth as needed for Erectile Dysfunction, Disp-10 tablet, R-5Normal      fluticasone (FLONASE) 50 MCG/ACT nasal spray 1 spray by Each Nostril route daily, Disp-1 Bottle, R-0Print      loratadine (CLARITIN) 10 MG tablet Take 1 tablet by mouth daily as needed (congestion and allergies), Disp-30 tablet, R-5Normal             ALLERGIES     Patient is is allergic to pcn [penicillins]. FAMILY HISTORY     Patient'sfamily history includes High Blood Pressure in his brother and mother. SOCIAL HISTORY     Patient  reports that he has never smoked. He has never used smokeless tobacco. He reports that he does not drink alcohol and does not use drugs. PHYSICAL EXAM     ED TRIAGE VITALS  BP: 129/87, Temp: 98.6 °F (37 °C), Pulse: 60, Resp: 18, SpO2: 98 %  Physical Exam  Vitals and nursing note reviewed. Constitutional:       General: He is not in acute distress. Appearance: He is well-developed. He is not ill-appearing. Comments: Moist membranes, nonicteric   HENT:      Head: Normocephalic and atraumatic. Right Ear: Tympanic membrane and external ear normal.      Left Ear: Tympanic membrane and external ear normal.      Nose: Nose normal.      Mouth/Throat:      Pharynx: No oropharyngeal exudate. Comments: Oropharynx normal  Eyes:      General: No scleral icterus. Right eye: No discharge. Left eye: No discharge. Extraocular Movements:      Right eye: Normal extraocular motion. Left eye: Normal extraocular motion. Conjunctiva/sclera: Conjunctivae normal.      Pupils: Pupils are equal, round, and reactive to light. Comments: Conjunctiva clear, nonicteric   Neck:      Thyroid: No thyromegaly. Vascular: No JVD. Comments: No meningismus  Cardiovascular:      Rate and Rhythm: Normal rate and regular rhythm. Pulses: Normal pulses. Heart sounds: Normal heart sounds, S1 normal and S2 normal. No murmur heard. No friction rub. No gallop. Pulmonary:      Effort: Pulmonary effort is normal. No respiratory distress. Breath sounds: Normal breath sounds. No stridor. No decreased breath sounds, wheezing, rhonchi or rales. Comments: No cough, lungs clear  Chest:      Chest wall: No tenderness. Abdominal:      General: Bowel sounds are normal. There is no distension. Palpations: Abdomen is soft. There is no mass. Tenderness: There is no abdominal tenderness. There is no right CVA tenderness, left CVA tenderness, guarding or rebound. Comments: Slight epigastric tenderness, no right lower quadrant tenderness, bowel sounds active   Musculoskeletal:         General: No tenderness. Normal range of motion. Cervical back: Normal range of motion. Comments: Joints normal   Lymphadenopathy:      Cervical: Cervical adenopathy present. Right cervical: Superficial cervical adenopathy present. No deep cervical adenopathy. Left cervical: Superficial cervical adenopathy present. No deep cervical adenopathy. Skin:     General: Skin is warm and dry. Findings: No erythema or rash. Comments: No rash or bruising on examined areas   Neurological:      Mental Status: He is alert and oriented to person, place, and time. Cranial Nerves: No cranial nerve deficit. Motor: No abnormal muscle tone. Coordination: Coordination normal.      Deep Tendon Reflexes: Reflexes are normal and symmetric.  Reflexes normal.      Comments: Appropriate, no focal findings   Psychiatric: Behavior: Behavior normal.         Thought Content: Thought content normal.         Judgment: Judgment normal.         DIAGNOSTIC RESULTS   Labs: No results found for this visit on 05/25/21. IMAGING:  No orders to display     URGENT CARE COURSE:     Vitals:    05/25/21 0819   BP: 129/87   Pulse: 60   Resp: 18   Temp: 98.6 °F (37 °C)   TempSrc: Temporal   SpO2: 98%   Weight: 243 lb (110.2 kg)   Height: 5' 11\" (1.803 m)       Medications - No data to display  PROCEDURES:  None  FINALIMPRESSION      1. Acute infectious nonbacterial gastroenteritis        DISPOSITION/PLAN   DISPOSITION    Nontoxic, well-hydrated, normal airway. No airway abscess or epiglottitis, sepsis, CNS infection, pneumonia, hypoxia, bronchospasm. Abdomen nonsurgical. No blood in diarrhea. Appears to have viral gastroenteritis without complications. Should do well with oral rehydration and clear liquids. Will treat with Tylenol, clear liquid diet, rest in cool air conditioned space. Patient to recheck with PCP in 3 days if problems persist, and patient understands to go to ED if worse.   PATIENT REFERRED TO:  CHYNA Smith - CNP  [address] [place]  [phone]    Schedule an appointment as soon as possible for a visit in 3 days  Recheck in office of problems persist, go to emergency if worse    DISCHARGE MEDICATIONS:  Discharge Medication List as of 5/25/2021  8:34 AM        Discharge Medication List as of 5/25/2021  8:34 AM          Sulema Boast, MD Sulema Boast, MD  05/25/21 3707

## 2021-06-23 ENCOUNTER — E-VISIT (OUTPATIENT)
Dept: FAMILY MEDICINE CLINIC | Age: 46
End: 2021-06-23

## 2021-06-23 DIAGNOSIS — K21.9 GASTROESOPHAGEAL REFLUX DISEASE WITHOUT ESOPHAGITIS: Primary | ICD-10-CM

## 2021-06-23 PROCEDURE — 98970 NQHP OL DIG ASSMT&MGMT 5-10: CPT | Performed by: NURSE PRACTITIONER

## 2021-06-23 RX ORDER — OMEPRAZOLE 40 MG/1
40 CAPSULE, DELAYED RELEASE ORAL
Qty: 30 CAPSULE | Refills: 11 | Status: SHIPPED | OUTPATIENT
Start: 2021-06-23 | End: 2022-08-23

## 2021-06-23 NOTE — PROGRESS NOTES
5-10 minutes were spent on the digital evaluation and management of this patient. [facility] Also instructed to use otc tums or maalox as needed. Avoid foods that cause symptoms.

## 2021-06-24 ENCOUNTER — TELEPHONE (OUTPATIENT)
Dept: FAMILY MEDICINE CLINIC | Age: 46
End: 2021-06-24

## 2021-06-24 NOTE — TELEPHONE ENCOUNTER
Pt called stating that he needs a slip to cover him at work for 6-22-21 and today 6-24-21. Pt was told that he can stop in the office today to  slip or can print from [address] Letter done and placed up front in our office.

## 2021-06-24 NOTE — LETTER
[facility]  [address] [place] [address]  Phone: [phone]  Fax: [phone]    CHYNA Eisenberg CNP        June 24, 2021     Patient: Riya Vidal   YOB: 1975   Date of Visit: 6-23-21       To Whom It May Concern: It is my medical opinion that Rudolph Judge may return to work on 6-25-21. This slip covers patient leaving work on 6-22-21 and 6-24-21. If you have any questions or concerns, please don't hesitate to call.     Sincerely,        CHYNA Eisenberg CNP

## 2021-08-09 ENCOUNTER — OFFICE VISIT (OUTPATIENT)
Dept: FAMILY MEDICINE CLINIC | Age: 46
End: 2021-08-09
Payer: COMMERCIAL

## 2021-08-09 VITALS
SYSTOLIC BLOOD PRESSURE: 128 MMHG | RESPIRATION RATE: 18 BRPM | TEMPERATURE: 97.5 F | HEART RATE: 66 BPM | WEIGHT: 250 LBS | BODY MASS INDEX: 34.87 KG/M2 | OXYGEN SATURATION: 97 % | DIASTOLIC BLOOD PRESSURE: 86 MMHG

## 2021-08-09 DIAGNOSIS — J01.00 ACUTE NON-RECURRENT MAXILLARY SINUSITIS: Primary | ICD-10-CM

## 2021-08-09 PROCEDURE — 99213 OFFICE O/P EST LOW 20 MIN: CPT | Performed by: NURSE PRACTITIONER

## 2021-08-09 RX ORDER — DOXYCYCLINE HYCLATE 100 MG
100 TABLET ORAL 2 TIMES DAILY
Qty: 20 TABLET | Refills: 0 | Status: SHIPPED | OUTPATIENT
Start: 2021-08-09 | End: 2021-08-19

## 2021-08-09 SDOH — ECONOMIC STABILITY: FOOD INSECURITY: WITHIN THE PAST 12 MONTHS, YOU WORRIED THAT YOUR FOOD WOULD RUN OUT BEFORE YOU GOT MONEY TO BUY MORE.: NEVER TRUE

## 2021-08-09 SDOH — ECONOMIC STABILITY: FOOD INSECURITY: WITHIN THE PAST 12 MONTHS, THE FOOD YOU BOUGHT JUST DIDN'T LAST AND YOU DIDN'T HAVE MONEY TO GET MORE.: NEVER TRUE

## 2021-08-09 ASSESSMENT — ENCOUNTER SYMPTOMS
RHINORRHEA: 1
DIARRHEA: 1
SINUS PRESSURE: 1
SINUS PAIN: 1
CONSTIPATION: 0

## 2021-08-09 ASSESSMENT — SOCIAL DETERMINANTS OF HEALTH (SDOH): HOW HARD IS IT FOR YOU TO PAY FOR THE VERY BASICS LIKE FOOD, HOUSING, MEDICAL CARE, AND HEATING?: NOT HARD AT ALL

## 2021-08-09 NOTE — PROGRESS NOTES
Antwan Schroeder (:  1975) is a 50 y.o. male,Established patient, here for evaluation of the following chief complaint(s):  Sinusitis (congestion back into ears and ear pain with some dizziness started tuesday last week)         ASSESSMENT/PLAN:  1. Acute non-recurrent maxillary sinusitis  -     doxycycline hyclate (VIBRA-TABS) 100 MG tablet; Take 1 tablet by mouth 2 times daily for 10 days, Disp-20 tablet, R-0Normal    Doxy as prescribed  otc symptomatic releivers  Plenty of fluids    Return if symptoms worsen or fail to improve. Subjective   SUBJECTIVE/OBJECTIVE:  HPI  Pt reports hx of sinus pain for a week, worse than normal allergy symptoms. Pt reports hx of sinus infections every year. Pt reports nasal drainage and post nasal drip. Pt has congestion in ears. Pt reports this is the worst sinus infection he has had. Significant dizziness from ear congestion. Review of Systems   HENT: Positive for congestion, rhinorrhea, sinus pressure and sinus pain. Gastrointestinal: Positive for diarrhea. Negative for constipation. Musculoskeletal: Negative for myalgias. Neurological: Positive for dizziness, light-headedness and headaches. Objective   Physical Exam  Constitutional:       Appearance: Normal appearance. HENT:      Head: Normocephalic and atraumatic. Right Ear: Tympanic membrane normal.      Left Ear: Tympanic membrane normal.      Nose: Congestion present. Mouth/Throat:      Mouth: Mucous membranes are moist.      Pharynx: Oropharynx is clear. No oropharyngeal exudate or posterior oropharyngeal erythema. Cardiovascular:      Rate and Rhythm: Normal rate. Pulmonary:      Effort: Pulmonary effort is normal. No respiratory distress. Breath sounds: Normal breath sounds. Neurological:      Mental Status: He is alert.             On this date 2021 I have spent 22 minutes reviewing previous notes, test results and face to face with the patient discussing the diagnosis and importance of compliance with the treatment plan as well as documenting on the day of the visit. An electronic signature was used to authenticate this note.     --CHYNA Navarro - CNP

## 2021-09-27 ENCOUNTER — NURSE TRIAGE (OUTPATIENT)
Dept: OTHER | Facility: CLINIC | Age: 46
End: 2021-09-27

## 2021-09-27 NOTE — TELEPHONE ENCOUNTER
Received call from [address] [place] [address] [facility]. Brief description of triage:   Patient disconnected in transfer, unable to triage, called patient number twice, went to unidentified mailbox, no message left      Attention Provider: Thank you for allowing me to participate in the care of your patient. The patient was connected to triage in response to information provided to the [facility]. Please do not respond through this encounter as the response is not directed to a shared pool. Reason for Disposition   Second attempt to contact family AND no contact made. Phone number verified.     Protocols used: NO CONTACT OR DUPLICATE CONTACT CALL-ADULT-

## 2021-09-28 ENCOUNTER — HOSPITAL ENCOUNTER (OUTPATIENT)
Age: 46
Discharge: HOME | End: 2021-09-28
Payer: COMMERCIAL

## 2021-09-28 ENCOUNTER — OFFICE VISIT (OUTPATIENT)
Dept: FAMILY MEDICINE CLINIC | Age: 46
End: 2021-09-28
Payer: COMMERCIAL

## 2021-09-28 VITALS
OXYGEN SATURATION: 97 % | TEMPERATURE: 97.5 F | WEIGHT: 253 LBS | DIASTOLIC BLOOD PRESSURE: 88 MMHG | BODY MASS INDEX: 35.29 KG/M2 | HEART RATE: 74 BPM | SYSTOLIC BLOOD PRESSURE: 126 MMHG | RESPIRATION RATE: 18 BRPM

## 2021-09-28 DIAGNOSIS — M54.50 ACUTE MIDLINE LOW BACK PAIN WITHOUT SCIATICA: ICD-10-CM

## 2021-09-28 DIAGNOSIS — S61.209A AVULSION OF SKIN OF FINGER WITHOUT COMPLICATION, INITIAL ENCOUNTER: Primary | ICD-10-CM

## 2021-09-28 PROCEDURE — 99213 OFFICE O/P EST LOW 20 MIN: CPT | Performed by: NURSE PRACTITIONER

## 2021-09-28 RX ORDER — CEPHALEXIN 500 MG/1
500 CAPSULE ORAL 3 TIMES DAILY
Qty: 21 CAPSULE | Refills: 0 | Status: SHIPPED | OUTPATIENT
Start: 2021-09-28 | End: 2021-10-05

## 2021-09-28 NOTE — LETTER
[address] [facility]  [address] [place] [address]  Phone: [phone]  Fax: [phone]    CHYNA Omalley CNP        September 28, 2021     Patient: Lan Chavez   YOB: 1975   Date of Visit: 9/28/2021       To Whom it May Concern:    Lori Lares was seen in my clinic on 9/28/2021. Off work 09/26/2021 and no working over 8 hours for the remainder of the week till 10/03/2021. If you have any questions or concerns, please don't hesitate to call.     Sincerely,           CHYNA Omalley CNP

## 2021-10-05 ASSESSMENT — ENCOUNTER SYMPTOMS
ABDOMINAL DISTENTION: 0
SHORTNESS OF BREATH: 0
BACK PAIN: 1
COUGH: 0
CONSTIPATION: 0
CHEST TIGHTNESS: 0
COLOR CHANGE: 0
WHEEZING: 0
SINUS PRESSURE: 0
STRIDOR: 0
DIARRHEA: 0
NAUSEA: 0
SORE THROAT: 0
VOMITING: 0
ABDOMINAL PAIN: 0

## 2021-10-17 ENCOUNTER — HOSPITAL ENCOUNTER (EMERGENCY)
Age: 46
Discharge: HOME | End: 2021-10-17
Payer: COMMERCIAL

## 2021-10-17 VITALS
RESPIRATION RATE: 18 BRPM | HEIGHT: 71 IN | OXYGEN SATURATION: 96 % | DIASTOLIC BLOOD PRESSURE: 100 MMHG | WEIGHT: 253 LBS | SYSTOLIC BLOOD PRESSURE: 145 MMHG | TEMPERATURE: 98.2 F | HEART RATE: 92 BPM | BODY MASS INDEX: 35.42 KG/M2

## 2021-10-17 DIAGNOSIS — R09.81 CONGESTION OF NASAL SINUS: Primary | ICD-10-CM

## 2021-10-17 PROCEDURE — 99213 OFFICE O/P EST LOW 20 MIN: CPT | Performed by: NURSE PRACTITIONER

## 2021-10-17 PROCEDURE — 99213 OFFICE O/P EST LOW 20 MIN: CPT

## 2021-10-17 RX ORDER — PSEUDOEPHEDRINE HYDROCHLORIDE 30 MG/1
30 TABLET ORAL EVERY 4 HOURS PRN
Qty: 42 TABLET | Refills: 1 | Status: SHIPPED | OUTPATIENT
Start: 2021-10-17 | End: 2021-10-24

## 2021-10-17 RX ORDER — ACETAMINOPHEN 325 MG/1
650 TABLET ORAL EVERY 6 HOURS PRN
Qty: 120 TABLET | Refills: 3 | Status: SHIPPED | OUTPATIENT
Start: 2021-10-17 | End: 2022-06-15

## 2021-10-17 ASSESSMENT — ENCOUNTER SYMPTOMS
SHORTNESS OF BREATH: 0
SORE THROAT: 0
COUGH: 1

## 2021-10-17 NOTE — ED PROVIDER NOTES
[facility]  Urgent Care Encounter       CHIEF COMPLAINT       Chief Complaint   Patient presents with    Sinusitis       Nurses Notes reviewed and I agree except as noted in the HPI. HISTORY OF PRESENT ILLNESS   Laura Lopez is a 48 y.o. male who presents with complaints of sinus congestion and drainage for the past 2 days. He states this is a new problem that has gradually worsened. He states he has been coughing up and spitting out green thick mucus. He denies trying any decongestants. He has been taking Flonase for relief. The treatment has been ineffective. He denies any fever or chills, or shortness of breath. The history is provided by the patient. REVIEW OF SYSTEMS     Review of Systems   Constitutional: Negative for chills and fever. HENT: Positive for congestion, ear pain (left fullness) and postnasal drip. Negative for sore throat. Respiratory: Positive for cough. Negative for shortness of breath. Cardiovascular: Negative for chest pain. Musculoskeletal: Negative for myalgias. Neurological: Negative for headaches. PAST MEDICAL HISTORY         Diagnosis Date    GERD (gastroesophageal reflux disease)     IBS (irritable bowel syndrome)        SURGICALHISTORY     Patient  has a past surgical history that includes back surgery.     CURRENT MEDICATIONS       Discharge Medication List as of 10/17/2021  5:39 PM      CONTINUE these medications which have NOT CHANGED    Details   omeprazole (PRILOSEC) 40 MG delayed release capsule Take 1 capsule by mouth daily (with breakfast), Disp-30 capsule, R-11Normal      fluticasone (FLONASE) 50 MCG/ACT nasal spray 1 spray by Each Nostril route daily, Disp-1 Bottle, R-0Print      loratadine (CLARITIN) 10 MG tablet Take 1 tablet by mouth daily as needed (congestion and allergies), Disp-30 tablet, R-5Normal      loperamide (IMODIUM) 2 MG capsule Take 2 mg by mouth 4 times daily as needed for DiarrheaHistorical Med      sildenafil (VIAGRA) 100 MG tablet Take 1 tablet by mouth as needed for Erectile Dysfunction, Disp-10 tablet, R-5Normal             ALLERGIES     Patient is is allergic to pcn [penicillins]. Patients   Immunization History   Administered Date(s) Administered    COVID-19, Moderna, PF, 100mcg/0.5mL 01/14/2021, 02/08/2021       FAMILY HISTORY     Patient's family history includes High Blood Pressure in his brother and mother. SOCIAL HISTORY     Patient  reports that he has never smoked. He has never used smokeless tobacco. He reports that he does not drink alcohol and does not use drugs. PHYSICAL EXAM     ED TRIAGE VITALS  BP: (!) 145/100, Temp: 98.2 °F (36.8 °C), Pulse: 92, Resp: 18, SpO2: 96 %,Estimated body mass index is 35.29 kg/m² as calculated from the following:    Height as of this encounter: 5' 11\" (1.803 m). Weight as of this encounter: 253 lb (114.8 kg). ,No LMP for male patient. Physical Exam  Vitals and nursing note reviewed. Constitutional:       General: He is not in acute distress. Appearance: He is ill-appearing. HENT:      Right Ear: Tympanic membrane, ear canal and external ear normal.      Left Ear: Tympanic membrane, ear canal and external ear normal.      Nose: Congestion present. No rhinorrhea. Mouth/Throat:      Mouth: Mucous membranes are moist.      Pharynx: Posterior oropharyngeal erythema present. Cardiovascular:      Rate and Rhythm: Normal rate and regular rhythm. Heart sounds: Normal heart sounds. Pulmonary:      Breath sounds: Normal breath sounds. Skin:     General: Skin is warm and dry. Neurological:      Mental Status: He is alert and oriented to person, place, and time. DIAGNOSTIC RESULTS     Labs:No results found for this visit on 10/17/21.     IMAGING:  None    EKG:  None    URGENT CARE COURSE:     Vitals:    10/17/21 1720   BP: (!) 145/100   Pulse: 92   Resp: 18   Temp: 98.2 °F (36.8 °C)   TempSrc: Temporal   SpO2: 96% Weight: 253 lb (114.8 kg)   Height: 5' 11\" (1.803 m)       Medications - No data to display         PROCEDURES:  None    FINAL IMPRESSION      1. Congestion of nasal sinus      DISPOSITION/ PLAN   DISPOSITION Decision To Discharge 10/17/2021 05:36:09 PM     Clinical exam consistent with congestion of the nasal sinus with postnasal drainage. Will start patient on a decongestant as needed. Advised to take over-the-counter Tylenol and Motrin as needed. May continue Flonase nasal spray. Follow-up with PCP in 1 week if worsens or fails to improve.     PATIENT REFERRED TO:  CHYNA Mancia CNP  [address] [place] Expose:  Discharge Medication List as of 10/17/2021  5:39 PM      START taking these medications    Details   pseudoephedrine (DECONGESTANT) 30 MG tablet Take 1 tablet by mouth every 4 hours as needed for Congestion, Disp-42 tablet, R-1Normal      acetaminophen (AMINOFEN) 325 MG tablet Take 2 tablets by mouth every 6 hours as needed for Pain, Disp-120 tablet, R-3Normal             Discharge Medication List as of 10/17/2021  5:39 PM          Discharge Medication List as of 10/17/2021  5:39 PM          CHYNA Kasper CNP    (Please note that portions of this note were completed with a voice recognition program. Efforts were made to edit the dictations but occasionally words are mis-transcribed.)           CHYNA Kasper CNP  10/17/21 7737

## 2021-10-17 NOTE — LETTER
[facility]  [address] [place] [address]  Phone: [phone]               October 17, 2021    Patient: Edilberto Katz   YOB: 1975   Date of Visit: 10/17/2021       To Whom It May Concern:    Capri Contreras was seen and treated in our emergency department on 10/17/2021. He may return to work on 10/18/2021.       Sincerely,       Beulah Tong RN         Signature:__________________________________

## 2021-10-18 ENCOUNTER — TELEPHONE (OUTPATIENT)
Dept: FAMILY MEDICINE CLINIC | Age: 46
End: 2021-10-18

## 2021-11-08 ENCOUNTER — HOSPITAL ENCOUNTER (EMERGENCY)
Age: 46
Discharge: HOME | End: 2021-11-08
Payer: COMMERCIAL

## 2021-11-08 VITALS
WEIGHT: 244 LBS | TEMPERATURE: 96.9 F | DIASTOLIC BLOOD PRESSURE: 96 MMHG | SYSTOLIC BLOOD PRESSURE: 144 MMHG | HEIGHT: 71 IN | RESPIRATION RATE: 18 BRPM | HEART RATE: 89 BPM | BODY MASS INDEX: 34.16 KG/M2 | OXYGEN SATURATION: 96 %

## 2021-11-08 DIAGNOSIS — B34.9 VIRAL ILLNESS: Primary | ICD-10-CM

## 2021-11-08 DIAGNOSIS — R19.7 DIARRHEA, UNSPECIFIED TYPE: ICD-10-CM

## 2021-11-08 LAB — SARS-COV-2, NAA: NOT  DETECTED

## 2021-11-08 PROCEDURE — 99213 OFFICE O/P EST LOW 20 MIN: CPT

## 2021-11-08 PROCEDURE — 87635 SARS-COV-2 COVID-19 AMP PRB: CPT

## 2021-11-08 PROCEDURE — 99213 OFFICE O/P EST LOW 20 MIN: CPT | Performed by: NURSE PRACTITIONER

## 2021-11-08 RX ORDER — IBUPROFEN 600 MG/1
600 TABLET ORAL EVERY 6 HOURS PRN
COMMUNITY
End: 2022-06-15

## 2021-11-08 ASSESSMENT — PAIN DESCRIPTION - FREQUENCY: FREQUENCY: INTERMITTENT

## 2021-11-08 ASSESSMENT — PAIN DESCRIPTION - PAIN TYPE: TYPE: ACUTE PAIN

## 2021-11-08 ASSESSMENT — ENCOUNTER SYMPTOMS
DIARRHEA: 1
ABDOMINAL DISTENTION: 0
COUGH: 0
NAUSEA: 1
SHORTNESS OF BREATH: 0
BLOOD IN STOOL: 0
VOMITING: 1

## 2021-11-08 ASSESSMENT — PAIN DESCRIPTION - LOCATION: LOCATION: OTHER (COMMENT)

## 2021-11-08 ASSESSMENT — PAIN DESCRIPTION - ONSET: ONSET: GRADUAL

## 2021-11-08 ASSESSMENT — PAIN SCALES - GENERAL: PAINLEVEL_OUTOF10: 6

## 2021-11-08 ASSESSMENT — PAIN DESCRIPTION - DESCRIPTORS: DESCRIPTORS: ACHING

## 2021-11-08 ASSESSMENT — PAIN - FUNCTIONAL ASSESSMENT: PAIN_FUNCTIONAL_ASSESSMENT: PREVENTS OR INTERFERES SOME ACTIVE ACTIVITIES AND ADLS

## 2021-11-08 ASSESSMENT — PAIN DESCRIPTION - PROGRESSION: CLINICAL_PROGRESSION: GRADUALLY WORSENING

## 2021-11-08 NOTE — ED TRIAGE NOTES
Pt co onset of diarrhea last evening with body aches and a few episodes of vomiting earlier today. Skin pink, warm, and dry. Pt admits to a negative home covid test earlier today.

## 2021-11-08 NOTE — ED PROVIDER NOTES
Take 1 capsule by mouth daily (with breakfast), Disp-30 capsule, R-11Normal      loperamide (IMODIUM) 2 MG capsule Take 2 mg by mouth 4 times daily as needed for DiarrheaHistorical Med      sildenafil (VIAGRA) 100 MG tablet Take 1 tablet by mouth as needed for Erectile Dysfunction, Disp-10 tablet, R-5Normal      fluticasone (FLONASE) 50 MCG/ACT nasal spray 1 spray by Each Nostril route daily, Disp-1 Bottle, R-0Print      loratadine (CLARITIN) 10 MG tablet Take 1 tablet by mouth daily as needed (congestion and allergies), Disp-30 tablet, R-5Normal      acetaminophen (AMINOFEN) 325 MG tablet Take 2 tablets by mouth every 6 hours as needed for Pain, Disp-120 tablet, R-3Normal             ALLERGIES     Patient is is allergic to pcn [penicillins]. Patients   Immunization History   Administered Date(s) Administered    COVID-19, Danny [place], Primary or Immunocompromised, PF, 100mcg/0.5mL 01/14/2021, 02/08/2021       FAMILY HISTORY     Patient's family history includes High Blood Pressure in his brother and mother. SOCIAL HISTORY     Patient  reports that he has never smoked. He has never used smokeless tobacco. He reports that he does not drink alcohol and does not use drugs. PHYSICAL EXAM     ED TRIAGE VITALS  BP: (!) 144/96, Temp: 96.9 °F (36.1 °C), Pulse: 89, Resp: 18, SpO2: 96 %,Estimated body mass index is 34.52 kg/m² as calculated from the following:    Height as of this encounter: 5' 10.5\" (1.791 m). Weight as of this encounter: 244 lb (110.7 kg). ,No LMP for male patient. Physical Exam  Vitals and nursing note reviewed. Constitutional:       General: He is not in acute distress. Appearance: He is well-developed. He is not ill-appearing. HENT:      Head: Normocephalic and atraumatic. Eyes:      Conjunctiva/sclera: Conjunctivae normal.      Pupils: Pupils are equal.   Cardiovascular:      Rate and Rhythm: Normal rate and regular rhythm.       Heart sounds: Normal heart sounds, S1 normal and S2 normal.   Pulmonary:      Effort: Pulmonary effort is normal.      Breath sounds: Normal breath sounds and air entry. Abdominal:      General: Bowel sounds are normal. There is no distension. Palpations: Abdomen is soft. There is no mass. Tenderness: There is no abdominal tenderness. There is no guarding or rebound. Skin:     General: Skin is warm and dry. Neurological:      General: No focal deficit present. Mental Status: He is alert and oriented to person, place, and time. Psychiatric:         Mood and Affect: Mood normal.         Speech: Speech normal.         Behavior: Behavior normal. Behavior is cooperative. DIAGNOSTIC RESULTS     Labs:  Results for orders placed or performed during the hospital encounter of 11/08/21   COVID-19, Rapid   Result Value Ref Range    SARS-CoV-2, MARY NOT  DETECTED NOT DETECTED       IMAGING:    No orders to display         EKG:      URGENT CARE COURSE:     Vitals:    11/08/21 1527   BP: (!) 144/96   Pulse: 89   Resp: 18   Temp: 96.9 °F (36.1 °C)   SpO2: 96%   Weight: 244 lb (110.7 kg)   Height: 5' 10.5\" (1.791 m)       Medications - No data to display         PROCEDURES:  None    FINAL IMPRESSION      1. Viral illness    2. Diarrhea, unspecified type          DISPOSITION/ PLAN     Patient presents with an acute viral illness and diarrhea. Covid test was negative. Imodium as needed for diarrhea. Tylenol and or Motrin as needed for pain and fever. Increase fluids. Follow-up with family doctor return to urgent care in the next 3 days if not improved. Further instructions were outlined verbally and in the patient's discharge instructions. All the patient's questions were answered. The patient/parent agreed with the plan and was discharged from the [facility] in good condition.       PATIENT REFERRED TO:  CHYNA Mathew - CNP  [address] [place] [facility] [address]      DISCHARGE MEDICATIONS:  Discharge Medication List as of 11/8/2021  4:25 PM          Discharge Medication List as of 11/8/2021  4:25 PM          Discharge Medication List as of 11/8/2021  4:25 PM          CHYNA Escobar CNP    (Please note that portions of this note were completed with a voice recognition program. Efforts were made to edit the dictations but occasionally words are mis-transcribed.)         CHYNA Escobar CNP  11/09/21 1719

## 2021-11-08 NOTE — Clinical Note
Joel Najera was seen and treated in our emergency department on 11/8/2021. He may return to work on 11/10/2021. If you have any questions or concerns, please don't hesitate to call.       Shavonne Weathers Case, APRN - CNP

## 2021-11-09 ASSESSMENT — ENCOUNTER SYMPTOMS
ABDOMINAL PAIN: 0
SORE THROAT: 0

## 2021-11-11 ENCOUNTER — TELEPHONE (OUTPATIENT)
Dept: FAMILY MEDICINE CLINIC | Age: 46
End: 2021-11-11

## 2021-11-29 ENCOUNTER — HOSPITAL ENCOUNTER (EMERGENCY)
Age: 46
Discharge: HOME | End: 2021-11-29
Payer: COMMERCIAL

## 2021-11-29 VITALS
SYSTOLIC BLOOD PRESSURE: 145 MMHG | RESPIRATION RATE: 18 BRPM | HEART RATE: 93 BPM | WEIGHT: 245 LBS | HEIGHT: 71 IN | OXYGEN SATURATION: 95 % | DIASTOLIC BLOOD PRESSURE: 91 MMHG | TEMPERATURE: 98.6 F | BODY MASS INDEX: 34.3 KG/M2

## 2021-11-29 DIAGNOSIS — J06.9 UPPER RESPIRATORY TRACT INFECTION, UNSPECIFIED TYPE: Primary | ICD-10-CM

## 2021-11-29 PROCEDURE — 99213 OFFICE O/P EST LOW 20 MIN: CPT | Performed by: NURSE PRACTITIONER

## 2021-11-29 PROCEDURE — 99213 OFFICE O/P EST LOW 20 MIN: CPT

## 2021-11-29 RX ORDER — PREDNISONE 20 MG/1
40 TABLET ORAL DAILY
Qty: 14 TABLET | Refills: 0 | Status: SHIPPED | OUTPATIENT
Start: 2021-11-29 | End: 2021-12-06

## 2021-11-29 RX ORDER — DOXYCYCLINE HYCLATE 100 MG
100 TABLET ORAL 2 TIMES DAILY
Qty: 14 TABLET | Refills: 0 | Status: SHIPPED | OUTPATIENT
Start: 2021-11-29 | End: 2021-12-06

## 2021-11-29 ASSESSMENT — ENCOUNTER SYMPTOMS
NAUSEA: 0
SORE THROAT: 0
SINUS PRESSURE: 1
SHORTNESS OF BREATH: 0
VOMITING: 0
DIARRHEA: 0
COUGH: 1

## 2021-11-29 NOTE — Clinical Note
Alma Jiang was seen and treated in our emergency department on 11/29/2021. He may return to work on 11/30/2021. If you have any questions or concerns, please don't hesitate to call.       Waldo Diaz, CHYNA - CNP

## 2021-11-29 NOTE — ED PROVIDER NOTES
[address]  Urgent Care Encounter       CHIEF COMPLAINT       Chief Complaint   Patient presents with    Cough     onset saturday     Sinus Problem     started off with sinus pressure, FUNEZ,  - thursday        Nurses Notes reviewed and I agree except as noted in the HPI. HISTORY OF PRESENT ILLNESS   Shonda Alvarenga is a 48 y.o. male who presents for evaluation of sinus congestion, sinus pressure, cough and bilateral ear pressure that of been ongoing for the past 5 days. Patient states that he did have a low-grade fever when the symptoms began. He denies any recent fever, chest tightness or shortness of breath. He states that he has been fully vaccinated for coronavirus. Patient states that he has had 2 at home test on separate days that were both negative for coronavirus. He denies any medications or interventions at home. The history is provided by the patient. REVIEW OF SYSTEMS     Review of Systems   Constitutional: Positive for chills and fever. HENT: Positive for congestion, ear pain and sinus pressure. Negative for sore throat. Respiratory: Positive for cough. Negative for shortness of breath. Cardiovascular: Negative for chest pain. Gastrointestinal: Negative for diarrhea, nausea and vomiting. Musculoskeletal: Negative for arthralgias and myalgias. Skin: Negative for rash. Allergic/Immunologic: Negative for environmental allergies. Neurological: Positive for headaches. PAST MEDICAL HISTORY         Diagnosis Date    GERD (gastroesophageal reflux disease)     IBS (irritable bowel syndrome)        SURGICALHISTORY     Patient  has a past surgical history that includes back surgery.     CURRENT MEDICATIONS       Previous Medications    ACETAMINOPHEN (AMINOFEN) 325 MG TABLET    Take 2 tablets by mouth every 6 hours as needed for Pain    FLUTICASONE (FLONASE) 50 MCG/ACT NASAL SPRAY    1 spray by Each Nostril route daily    IBUPROFEN (ADVIL;MOTRIN) 600 MG TABLET    Take 600 mg by mouth every 6 hours as needed for Pain    LOPERAMIDE (IMODIUM) 2 MG CAPSULE    Take 2 mg by mouth 4 times daily as needed for Diarrhea    LORATADINE (CLARITIN) 10 MG TABLET    Take 1 tablet by mouth daily as needed (congestion and allergies)    OMEPRAZOLE (PRILOSEC) 40 MG DELAYED RELEASE CAPSULE    Take 1 capsule by mouth daily (with breakfast)    SILDENAFIL (VIAGRA) 100 MG TABLET    Take 1 tablet by mouth as needed for Erectile Dysfunction       ALLERGIES     Patient is is allergic to pcn [penicillins]. Patients   Immunization History   Administered Date(s) Administered    COVID-19, Khoashea Robb, Primary or Immunocompromised, PF, 100mcg/0.5mL 01/14/2021, 02/08/2021       FAMILY HISTORY     Patient's family history includes High Blood Pressure in his brother and mother; Other in his father. SOCIAL HISTORY     Patient  reports that he has never smoked. He has never used smokeless tobacco. He reports that he does not drink alcohol and does not use drugs. PHYSICAL EXAM     ED TRIAGE VITALS  BP: (!) 145/91, Temp: 98.6 °F (37 °C), Pulse: 93, Resp: 18, SpO2: 95 %,Estimated body mass index is 34.17 kg/m² as calculated from the following:    Height as of this encounter: 5' 11\" (1.803 m). Weight as of this encounter: 245 lb (111.1 kg). ,No LMP for male patient. Physical Exam  Vitals and nursing note reviewed. Constitutional:       General: He is not in acute distress. Appearance: He is well-developed. He is not diaphoretic. HENT:      Right Ear: Tympanic membrane is erythematous and bulging. Left Ear: Tympanic membrane is erythematous and bulging. Nose:      Right Sinus: Maxillary sinus tenderness present. No frontal sinus tenderness. Left Sinus: Maxillary sinus tenderness present. No frontal sinus tenderness. Mouth/Throat:      Mouth: Mucous membranes are moist.      Pharynx: Oropharynx is clear.    Eyes:      Conjunctiva/sclera:      Right eye: Right conjunctiva is not injected. Left eye: Left conjunctiva is not injected. Pupils: Pupils are equal.   Cardiovascular:      Rate and Rhythm: Normal rate and regular rhythm. Heart sounds: No murmur heard. Pulmonary:      Effort: Pulmonary effort is normal. No respiratory distress. Breath sounds: Normal breath sounds. Musculoskeletal:      Cervical back: Normal range of motion. Right knee: Normal range of motion. Left knee: Normal range of motion. Skin:     General: Skin is warm. Findings: No rash. Neurological:      Mental Status: He is alert and oriented to person, place, and time. Psychiatric:         Behavior: Behavior normal.         DIAGNOSTIC RESULTS     Labs:No results found for this visit on 11/29/21. IMAGING:    No orders to display         EKG:      URGENT CARE COURSE:     Vitals:    11/29/21 1145   BP: (!) 145/91   Pulse: 93   Resp: 18   Temp: 98.6 °F (37 °C)   TempSrc: Temporal   SpO2: 95%   Weight: 245 lb (111.1 kg)   Height: 5' 11\" (1.803 m)       Medications - No data to display         PROCEDURES:  None    FINAL IMPRESSION      1. Upper respiratory tract infection, unspecified type          DISPOSITION/ PLAN       I discussed with the patient that exam is consistent with an upper respiratory infection that has led to both bacterial sinusitis and bronchitis. Discussed plan to treat with oral antibiotics and steroids and the patient is advised to rest and hydrate at home. I did offer a repeat Covid test, however he states he would prefer to hold off as he has had 2 negatives at home. He is agreeable to plan as discussed.     PATIENT REFERRED TO:  CHYNA Amaro - CNP  [address] Oliveira:  New Prescriptions    DOXYCYCLINE HYCLATE (VIBRA-TABS) 100 MG TABLET    Take 1 tablet by mouth 2 times daily for 7 days    PREDNISONE (DELTASONE) 20 MG TABLET    Take 2 tablets by mouth daily for 7 days Discontinued Medications    No medications on file       Current Discharge Medication List          CHYNA Mane CNP    (Please note that portions of this note were completed with a voice recognition program. Efforts were made to edit the dictations but occasionally words are mis-transcribed.)          CHYNA Mane CNP  11/29/21 4319

## 2021-12-01 ENCOUNTER — TELEPHONE (OUTPATIENT)
Dept: FAMILY MEDICINE CLINIC | Age: 46
End: 2021-12-01

## 2022-01-06 ENCOUNTER — OFFICE VISIT (OUTPATIENT)
Dept: FAMILY MEDICINE CLINIC | Age: 47
End: 2022-01-06
Payer: COMMERCIAL

## 2022-01-06 VITALS
HEIGHT: 71 IN | BODY MASS INDEX: 33.88 KG/M2 | RESPIRATION RATE: 18 BRPM | DIASTOLIC BLOOD PRESSURE: 64 MMHG | HEART RATE: 68 BPM | TEMPERATURE: 97.2 F | WEIGHT: 242 LBS | SYSTOLIC BLOOD PRESSURE: 118 MMHG

## 2022-01-06 DIAGNOSIS — H02.823 CYST OF EYELID, RIGHT: Primary | ICD-10-CM

## 2022-01-06 PROCEDURE — 99213 OFFICE O/P EST LOW 20 MIN: CPT | Performed by: NURSE PRACTITIONER

## 2022-01-06 RX ORDER — CEPHALEXIN 500 MG/1
500 CAPSULE ORAL 3 TIMES DAILY
Qty: 30 CAPSULE | Refills: 0 | Status: SHIPPED | OUTPATIENT
Start: 2022-01-06 | End: 2022-02-24

## 2022-01-06 RX ORDER — NYSTATIN 100000 U/G
OINTMENT TOPICAL
Qty: 30 G | Refills: 0 | Status: SHIPPED | OUTPATIENT
Start: 2022-01-06 | End: 2022-02-24

## 2022-01-06 NOTE — PROGRESS NOTES
test results and face to face with the patient discussing the diagnosis and importance of compliance with the treatment plan as well as documenting on the day of the visit. An electronic signature was used to authenticate this note.     --CHYNA Parr - CNP

## 2022-01-13 ASSESSMENT — ENCOUNTER SYMPTOMS
VOMITING: 0
ABDOMINAL PAIN: 0
WHEEZING: 0
STRIDOR: 0
SORE THROAT: 0
CHEST TIGHTNESS: 0
NAUSEA: 0
DIARRHEA: 0
COLOR CHANGE: 0
SHORTNESS OF BREATH: 0
CONSTIPATION: 0
BACK PAIN: 0
COUGH: 0
SINUS PRESSURE: 0
ABDOMINAL DISTENTION: 0

## 2022-02-24 ENCOUNTER — OFFICE VISIT (OUTPATIENT)
Dept: FAMILY MEDICINE CLINIC | Age: 47
End: 2022-02-24
Payer: COMMERCIAL

## 2022-02-24 VITALS
WEIGHT: 253 LBS | DIASTOLIC BLOOD PRESSURE: 88 MMHG | OXYGEN SATURATION: 97 % | SYSTOLIC BLOOD PRESSURE: 139 MMHG | RESPIRATION RATE: 18 BRPM | BODY MASS INDEX: 35.29 KG/M2 | TEMPERATURE: 98 F | HEART RATE: 88 BPM

## 2022-02-24 DIAGNOSIS — Z12.11 SCREENING FOR MALIGNANT NEOPLASM OF COLON: ICD-10-CM

## 2022-02-24 DIAGNOSIS — Z13.1 SCREENING FOR DIABETES MELLITUS: ICD-10-CM

## 2022-02-24 DIAGNOSIS — J01.00 ACUTE NON-RECURRENT MAXILLARY SINUSITIS: Primary | ICD-10-CM

## 2022-02-24 DIAGNOSIS — Z13.220 SCREENING FOR LIPID DISORDERS: ICD-10-CM

## 2022-02-24 DIAGNOSIS — Z13.0 SCREENING FOR DEFICIENCY ANEMIA: ICD-10-CM

## 2022-02-24 PROCEDURE — 99213 OFFICE O/P EST LOW 20 MIN: CPT | Performed by: NURSE PRACTITIONER

## 2022-02-24 RX ORDER — DOXYCYCLINE HYCLATE 100 MG
100 TABLET ORAL 2 TIMES DAILY
Qty: 20 TABLET | Refills: 0 | Status: SHIPPED | OUTPATIENT
Start: 2022-02-24 | End: 2022-04-13 | Stop reason: SDUPTHER

## 2022-02-24 RX ORDER — PREDNISONE 20 MG/1
20 TABLET ORAL 2 TIMES DAILY
Qty: 10 TABLET | Refills: 0 | Status: SHIPPED | OUTPATIENT
Start: 2022-02-24 | End: 2022-04-13 | Stop reason: SDUPTHER

## 2022-02-24 NOTE — PROGRESS NOTES
Mirlande Tamayo (:  1975) is a 48 y.o. male,Established patient, here for evaluation of the following chief complaint(s):  Ear Fullness (x 1 month), Sinusitis (off and on for over a month), and Other (had COVID in January)         ASSESSMENT/PLAN:  1. Acute non-recurrent maxillary sinusitis  2. Screening for lipid disorders  -     Lipid Panel; Future  3. Screening for malignant neoplasm of colon  -     Fecal DNA Colorectal cancer screening (Cologuard)  4. Screening for diabetes mellitus  -     Comprehensive Metabolic Panel; Future  5. Screening for deficiency anemia  -     CBC with Auto Differential; Future      meds as prescribed  Fluids  Rest  otc symptomatic relievers    Return if symptoms worsen or fail to improve. Subjective   SUBJECTIVE/OBJECTIVE:  HPI    Sinus pain and congestion. Started a month ago. Getting worse. Painful at times. Cant  Breath through nose. Pressure in ears. Dizzy. No fevers. No cough. Review of Systems   HENT: Positive for congestion, ear pain and sinus pressure. Neurological: Positive for dizziness and headaches. Objective   Physical Exam  Constitutional:       Appearance: Normal appearance. HENT:      Head: Normocephalic and atraumatic. Right Ear: Tympanic membrane normal.      Left Ear: Tympanic membrane normal.      Nose: Congestion present. Mouth/Throat:      Mouth: Mucous membranes are moist.      Pharynx: Oropharynx is clear. No oropharyngeal exudate or posterior oropharyngeal erythema. Cardiovascular:      Rate and Rhythm: Normal rate and regular rhythm. Pulmonary:      Effort: Pulmonary effort is normal.      Breath sounds: Normal breath sounds. Skin:     General: Skin is warm and dry. Neurological:      Mental Status: He is alert and oriented to person, place, and time.             On this date 2022 I have spent 24 minutes reviewing previous notes, test results and face to face with the patient discussing the diagnosis and importance of compliance with the treatment plan as well as documenting on the day of the visit. An electronic signature was used to authenticate this note.     --CHYNA Smith CNP

## 2022-03-03 ASSESSMENT — ENCOUNTER SYMPTOMS: SINUS PRESSURE: 1

## 2022-04-06 ENCOUNTER — OFFICE VISIT (OUTPATIENT)
Dept: FAMILY MEDICINE CLINIC | Age: 47
End: 2022-04-06
Payer: COMMERCIAL

## 2022-04-06 VITALS
BODY MASS INDEX: 35.84 KG/M2 | HEIGHT: 71 IN | WEIGHT: 256 LBS | OXYGEN SATURATION: 96 % | RESPIRATION RATE: 18 BRPM | SYSTOLIC BLOOD PRESSURE: 110 MMHG | HEART RATE: 65 BPM | DIASTOLIC BLOOD PRESSURE: 66 MMHG | TEMPERATURE: 97.2 F

## 2022-04-06 DIAGNOSIS — R45.86 MOOD SWINGS: ICD-10-CM

## 2022-04-06 DIAGNOSIS — F41.9 ANXIETY: Primary | ICD-10-CM

## 2022-04-06 PROCEDURE — 99214 OFFICE O/P EST MOD 30 MIN: CPT | Performed by: NURSE PRACTITIONER

## 2022-04-06 RX ORDER — HYDROXYZINE HYDROCHLORIDE 25 MG/1
25 TABLET, FILM COATED ORAL EVERY 8 HOURS PRN
Qty: 30 TABLET | Refills: 0 | Status: SHIPPED | OUTPATIENT
Start: 2022-04-06 | End: 2022-04-16

## 2022-04-06 RX ORDER — SILDENAFIL 100 MG/1
100 TABLET, FILM COATED ORAL PRN
Qty: 10 TABLET | Refills: 5 | Status: SHIPPED | OUTPATIENT
Start: 2022-04-06 | End: 2022-06-15

## 2022-04-06 RX ORDER — FLUOXETINE HYDROCHLORIDE 20 MG/1
20 CAPSULE ORAL DAILY
Qty: 30 CAPSULE | Refills: 3 | Status: SHIPPED | OUTPATIENT
Start: 2022-04-06 | End: 2022-05-12 | Stop reason: SDUPTHER

## 2022-04-06 NOTE — PROGRESS NOTES
Raya Flowers (:  1975) is a 48 y.o. male,Established patient, here for evaluation of the following chief complaint(s): Other (angry, anxious )         ASSESSMENT/PLAN:  1. Anxiety  2. Mood swings      Start prozac  Atarax as needed for anxiety, watch for drowsiness  Exercise  Eat healthy  Consider counselor if no better  Return in 4 weeks for recheck    Return in about 4 weeks (around 2022). Subjective   SUBJECTIVE/OBJECTIVE:  HPI    4year-old male who I am seeing today for anxiety and anger episodes. He states he has been very anxious. He has had some added stress at work. He is getting angry at people very easily. He denies any depressed thoughts. He has not been on anything in the past.  He states he has always had some minor mood swings but nothing like this. He feels stressed all the time. He would like to get on something. Review of Systems   Constitutional: Negative for activity change, appetite change, chills, diaphoresis, fatigue, fever and unexpected weight change. HENT: Negative for congestion, ear pain, postnasal drip, sinus pressure and sore throat. Eyes: Negative for visual disturbance. Respiratory: Negative for cough, chest tightness, shortness of breath, wheezing and stridor. Cardiovascular: Negative for chest pain, palpitations and leg swelling. Gastrointestinal: Negative for abdominal distention, abdominal pain, constipation, diarrhea, nausea and vomiting. Endocrine: Negative for polydipsia, polyphagia and polyuria. Genitourinary: Negative for decreased urine volume, difficulty urinating, dysuria, flank pain, frequency, hematuria and urgency. Musculoskeletal: Negative for arthralgias, back pain, gait problem, joint swelling, myalgias and neck pain. Skin: Negative for color change, pallor and rash. Neurological: Negative for dizziness, syncope, weakness, light-headedness, numbness and headaches. Hematological: Negative for adenopathy. Psychiatric/Behavioral: Positive for agitation and sleep disturbance. Negative for behavioral problems and self-injury. The patient is nervous/anxious. Objective   Physical Exam  Vitals reviewed. Constitutional:       General: He is not in acute distress. Appearance: Normal appearance. He is well-developed. HENT:      Head: Normocephalic. Right Ear: External ear normal.      Left Ear: External ear normal.      Nose: Nose normal.      Right Sinus: No maxillary sinus tenderness. Left Sinus: No maxillary sinus tenderness. Mouth/Throat:      Pharynx: Uvula midline. Neck:      Trachea: Trachea normal.   Cardiovascular:      Rate and Rhythm: Normal rate and regular rhythm. Heart sounds: Normal heart sounds. No murmur heard. Pulmonary:      Effort: Pulmonary effort is normal. No respiratory distress. Breath sounds: Normal breath sounds. No decreased breath sounds, wheezing, rhonchi or rales. Chest:      Chest wall: No tenderness. Abdominal:      General: There is no distension. Palpations: Abdomen is soft. There is no mass. Tenderness: There is no abdominal tenderness. Musculoskeletal:         General: No tenderness or deformity. Normal range of motion. Cervical back: Normal range of motion and neck supple. Lymphadenopathy:      Cervical: No cervical adenopathy. Skin:     General: Skin is warm and dry. Neurological:      Mental Status: He is alert and oriented to person, place, and time. Motor: No abnormal muscle tone. Coordination: Coordination normal.      Gait: Gait normal.   Psychiatric:         Mood and Affect: Mood is anxious. Mood is not depressed. Speech: Speech normal.         Behavior: Behavior normal.         Thought Content:  Thought content normal.         Judgment: Judgment normal.            On this date 4/6/2022 I have spent 31 minutes reviewing previous notes, test results and face to face with the patient

## 2022-04-12 ASSESSMENT — ENCOUNTER SYMPTOMS
WHEEZING: 0
STRIDOR: 0
SHORTNESS OF BREATH: 0
SINUS PRESSURE: 0
VOMITING: 0
BACK PAIN: 0
ABDOMINAL DISTENTION: 0
ABDOMINAL PAIN: 0
CONSTIPATION: 0
COLOR CHANGE: 0
CHEST TIGHTNESS: 0
DIARRHEA: 0
NAUSEA: 0
SORE THROAT: 0
COUGH: 0

## 2022-04-13 ENCOUNTER — TELEMEDICINE (OUTPATIENT)
Dept: FAMILY MEDICINE CLINIC | Age: 47
End: 2022-04-13
Payer: COMMERCIAL

## 2022-04-13 DIAGNOSIS — J01.00 ACUTE NON-RECURRENT MAXILLARY SINUSITIS: ICD-10-CM

## 2022-04-13 DIAGNOSIS — F41.9 ANXIETY: Primary | ICD-10-CM

## 2022-04-13 PROCEDURE — 99214 OFFICE O/P EST MOD 30 MIN: CPT | Performed by: NURSE PRACTITIONER

## 2022-04-13 RX ORDER — DOXYCYCLINE HYCLATE 100 MG
100 TABLET ORAL 2 TIMES DAILY
Qty: 20 TABLET | Refills: 0 | Status: SHIPPED | OUTPATIENT
Start: 2022-04-13 | End: 2022-04-23

## 2022-04-13 RX ORDER — PREDNISONE 20 MG/1
20 TABLET ORAL 2 TIMES DAILY
Qty: 10 TABLET | Refills: 0 | Status: SHIPPED | OUTPATIENT
Start: 2022-04-13 | End: 2022-04-18

## 2022-04-13 NOTE — LETTER
[address] [facility]  [address] [place] [address]  Phone: [phone]  Fax: [phone]    CHYNA Fermin CNP        April 13, 2022     Patient: Julisa Giron   YOB: 1975   Date of Visit: 4/13/2022       To Whom It May Concern: It is my medical opinion that Mahesh Colon may return to work on 04/13/2022. Off 04/12/2022. If you have any questions or concerns, please don't hesitate to call.     Sincerely,          CHYNA Fermin CNP

## 2022-04-13 NOTE — PROGRESS NOTES
Marvin Wilburn (:  1975) is a Established patient, here for evaluation of the following:    Assessment & Plan   Below is the assessment and plan developed based on review of pertinent history, physical exam, labs, studies, and medications. 1. Anxiety  2. Acute non-recurrent maxillary sinusitis    meds as prescribed  Fluids  Rest  otc symptomatic relievers  Continue prozac and monitor symptoms  Return if no better    Return in about 4 weeks (around 2022). Subjective   HPI     Started prozac recently. Upset stomach and diarrhea. Bad headache. Starting to get a little better. Thinks prozac might be starting to work. Does have congestion and cough as well. No fevers. haven't been feeling good for almost a week now. Denies sob or cp. Review of Systems   Constitutional: Positive for fatigue. Negative for activity change, appetite change, chills, diaphoresis, fever and unexpected weight change. HENT: Positive for congestion, postnasal drip and sinus pressure. Negative for ear pain and sore throat. Eyes: Negative for visual disturbance. Respiratory: Positive for cough. Negative for chest tightness, shortness of breath, wheezing and stridor. Cardiovascular: Negative for chest pain, palpitations and leg swelling. Gastrointestinal: Positive for diarrhea and nausea. Negative for abdominal distention, abdominal pain, constipation and vomiting. Endocrine: Negative for polydipsia, polyphagia and polyuria. Genitourinary: Negative for decreased urine volume, difficulty urinating, dysuria, flank pain, frequency, hematuria and urgency. Musculoskeletal: Negative for arthralgias, back pain, gait problem, joint swelling, myalgias and neck pain. Skin: Negative for color change, pallor and rash. Neurological: Negative for dizziness, syncope, weakness, light-headedness, numbness and headaches. Hematological: Negative for adenopathy.    Psychiatric/Behavioral: Negative for behavioral problems, self-injury and sleep disturbance. The patient is not nervous/anxious. Objective   Patient-Reported Vitals  No data recorded     Physical Exam  [INSTRUCTIONS:  \"[x]\" Indicates a positive item  \"[]\" Indicates a negative item  -- DELETE ALL ITEMS NOT EXAMINED]    Constitutional: [x] Appears well-developed and well-nourished [x] No apparent distress      [] Abnormal -     Mental status: [x] Alert and awake  [x] Oriented to person/place/time [x] Able to follow commands    [] Abnormal -     Eyes:   EOM    [x]  Normal    [] Abnormal -   Sclera  [x]  Normal    [] Abnormal -          Discharge [x]  None visible   [] Abnormal -     HENT: [x] Normocephalic, atraumatic  [] Abnormal -   [x] Mouth/Throat: Mucous membranes are moist    External Ears [x] Normal  [] Abnormal -    Neck: [x] No visualized mass [] Abnormal -     Pulmonary/Chest: [x] Respiratory effort normal   [x] No visualized signs of difficulty breathing or respiratory distress        [] Abnormal -      Musculoskeletal:   [x] Normal gait with no signs of ataxia         [x] Normal range of motion of neck        [] Abnormal -     Neurological:        [x] No Facial Asymmetry (Cranial nerve 7 motor function) (limited exam due to video visit)          [x] No gaze palsy        [] Abnormal -          Skin:        [x] No significant exanthematous lesions or discoloration noted on facial skin         [] Abnormal -            Psychiatric:       [x] Normal Affect [] Abnormal -        [x] No Hallucinations    Other pertinent observable physical exam findings:-             On this date 4/13/2022 I have spent 32 minutes reviewing previous notes, test results and face to face (virtual) with the patient discussing the diagnosis and importance of compliance with the treatment plan as well as documenting on the day of the visit. Juma Riley, was evaluated through a synchronous (real-time) audio-video encounter.  The patient (or guardian if applicable) is aware that this is a billable service, which includes applicable co-pays. This Virtual Visit was conducted with patient's (and/or legal guardian's) consent. The visit was conducted pursuant to the emergency declaration under the [address] [place] [address], [address] [place] [address] authority and the [employer] and [employer] Act. Patient identification was verified, and a caregiver was present when appropriate. The patient was located at home in a state where the provider was licensed to provide care.        --CHYNA Hurt - CNP

## 2022-04-20 ASSESSMENT — ENCOUNTER SYMPTOMS
VOMITING: 0
COUGH: 1
NAUSEA: 1
ABDOMINAL PAIN: 0
SHORTNESS OF BREATH: 0
DIARRHEA: 1
SINUS PRESSURE: 1
BACK PAIN: 0
CHEST TIGHTNESS: 0
WHEEZING: 0
COLOR CHANGE: 0
ABDOMINAL DISTENTION: 0
STRIDOR: 0
SORE THROAT: 0
CONSTIPATION: 0

## 2022-05-12 ENCOUNTER — OFFICE VISIT (OUTPATIENT)
Dept: FAMILY MEDICINE CLINIC | Age: 47
End: 2022-05-12
Payer: COMMERCIAL

## 2022-05-12 VITALS
SYSTOLIC BLOOD PRESSURE: 126 MMHG | RESPIRATION RATE: 18 BRPM | OXYGEN SATURATION: 98 % | TEMPERATURE: 97.6 F | DIASTOLIC BLOOD PRESSURE: 72 MMHG | BODY MASS INDEX: 34.16 KG/M2 | HEART RATE: 68 BPM | HEIGHT: 71 IN | WEIGHT: 244 LBS

## 2022-05-12 DIAGNOSIS — R45.86 MOOD SWINGS: ICD-10-CM

## 2022-05-12 DIAGNOSIS — F41.9 ANXIETY: Primary | ICD-10-CM

## 2022-05-12 DIAGNOSIS — K21.9 GASTROESOPHAGEAL REFLUX DISEASE WITHOUT ESOPHAGITIS: ICD-10-CM

## 2022-05-12 PROCEDURE — 99214 OFFICE O/P EST MOD 30 MIN: CPT | Performed by: NURSE PRACTITIONER

## 2022-05-12 RX ORDER — FLUOXETINE HYDROCHLORIDE 20 MG/1
20 CAPSULE ORAL DAILY
Qty: 30 CAPSULE | Refills: 5 | Status: SHIPPED | OUTPATIENT
Start: 2022-05-12 | End: 2022-08-23 | Stop reason: SDUPTHER

## 2022-05-12 NOTE — PROGRESS NOTES
Romy Ray (:  1975) is a 48 y.o. male,Established patient, here for evaluation of the following chief complaint(s):  Follow-up (6 week follow up )         ASSESSMENT/PLAN:  1. Anxiety  2. Mood swings  3. Gastroesophageal reflux disease without esophagitis    Continue with prozac 20 mg daily  Monitor mood closely  Exercise which will help with mood. GERD stable  Watch diet    Return in about 6 months (around 2022). Subjective   SUBJECTIVE/OBJECTIVE:  HPI    Started prozac last visit. Can tell a difference. Feels more relaxed and things do not set him off like before. Wife can tell the difference. Wants to stay on current dose. No side effects. Denies any depressed thoughts. GERD doing good. Review of Systems   Constitutional: Negative for activity change, appetite change, chills, diaphoresis, fatigue, fever and unexpected weight change. HENT: Negative for congestion, ear pain, postnasal drip, sinus pressure and sore throat. Eyes: Negative for visual disturbance. Respiratory: Negative for cough, chest tightness, shortness of breath, wheezing and stridor. Cardiovascular: Negative for chest pain, palpitations and leg swelling. Gastrointestinal: Negative for abdominal distention, abdominal pain, constipation, diarrhea, nausea and vomiting. Endocrine: Negative for polydipsia, polyphagia and polyuria. Genitourinary: Negative for decreased urine volume, difficulty urinating, dysuria, flank pain, frequency, hematuria and urgency. Musculoskeletal: Negative for arthralgias, back pain, gait problem, joint swelling, myalgias and neck pain. Skin: Negative for color change, pallor and rash. Neurological: Negative for dizziness, syncope, weakness, light-headedness, numbness and headaches. Hematological: Negative for adenopathy. Psychiatric/Behavioral: Negative for behavioral problems, self-injury and sleep disturbance. The patient is not nervous/anxious. Objective   Physical Exam  Vitals reviewed. Constitutional:       General: He is not in acute distress. Appearance: Normal appearance. He is well-developed. HENT:      Head: Normocephalic. Right Ear: External ear normal.      Left Ear: External ear normal.      Nose: Nose normal.      Right Sinus: No maxillary sinus tenderness. Left Sinus: No maxillary sinus tenderness. Mouth/Throat:      Pharynx: Uvula midline. Neck:      Trachea: Trachea normal.   Cardiovascular:      Rate and Rhythm: Normal rate and regular rhythm. Heart sounds: Normal heart sounds. No murmur heard. Pulmonary:      Effort: Pulmonary effort is normal. No respiratory distress. Breath sounds: Normal breath sounds. No decreased breath sounds, wheezing, rhonchi or rales. Chest:      Chest wall: No tenderness. Abdominal:      General: There is no distension. Palpations: Abdomen is soft. There is no mass. Tenderness: There is no abdominal tenderness. Musculoskeletal:         General: No tenderness or deformity. Normal range of motion. Cervical back: Normal range of motion and neck supple. Lymphadenopathy:      Cervical: No cervical adenopathy. Skin:     General: Skin is warm and dry. Neurological:      Mental Status: He is alert and oriented to person, place, and time. Motor: No abnormal muscle tone. Coordination: Coordination normal.      Gait: Gait normal.   Psychiatric:         Mood and Affect: Mood normal.         Behavior: Behavior normal.         Thought Content: Thought content normal.         Judgment: Judgment normal.            On this date 5/12/2022 I have spent 31 minutes reviewing previous notes, test results and face to face with the patient discussing the diagnosis and importance of compliance with the treatment plan as well as documenting on the day of the visit. An electronic signature was used to authenticate this note.     --Porsche Urbano, APRN - CNP

## 2022-05-18 ASSESSMENT — ENCOUNTER SYMPTOMS
CONSTIPATION: 0
SINUS PRESSURE: 0
DIARRHEA: 0
SHORTNESS OF BREATH: 0
COUGH: 0
BACK PAIN: 0
ABDOMINAL PAIN: 0
WHEEZING: 0
VOMITING: 0
ABDOMINAL DISTENTION: 0
COLOR CHANGE: 0
SORE THROAT: 0
CHEST TIGHTNESS: 0
NAUSEA: 0
STRIDOR: 0

## 2022-06-15 ENCOUNTER — OFFICE VISIT (OUTPATIENT)
Dept: FAMILY MEDICINE CLINIC | Age: 47
End: 2022-06-15
Payer: COMMERCIAL

## 2022-06-15 VITALS
SYSTOLIC BLOOD PRESSURE: 120 MMHG | BODY MASS INDEX: 35.48 KG/M2 | RESPIRATION RATE: 18 BRPM | DIASTOLIC BLOOD PRESSURE: 84 MMHG | HEIGHT: 71 IN | WEIGHT: 253.4 LBS | OXYGEN SATURATION: 97 % | TEMPERATURE: 97.8 F | HEART RATE: 62 BPM

## 2022-06-15 DIAGNOSIS — K52.9 ACUTE GASTROENTERITIS: Primary | ICD-10-CM

## 2022-06-15 PROCEDURE — 99213 OFFICE O/P EST LOW 20 MIN: CPT | Performed by: FAMILY MEDICINE

## 2022-06-15 ASSESSMENT — ENCOUNTER SYMPTOMS
NAUSEA: 1
DIARRHEA: 1
WHEEZING: 0
SHORTNESS OF BREATH: 0
CONSTIPATION: 0
ANAL BLEEDING: 0
RHINORRHEA: 0
BLOATING: 1
BLOOD IN STOOL: 0
COUGH: 0
ABDOMINAL PAIN: 0
FLATUS: 1
SORE THROAT: 0
VOMITING: 0

## 2022-06-15 NOTE — LETTER
[address] [facility]  [address] [place] [address]  Phone: [phone]  Fax: [phone]    Josep Hickey MD        Hannah 15, 2022     Patient: Romy Ray   YOB: 1975   Date of Visit: 6/15/2022       To Whom it May Concern:    Marily Mansfield was seen in my clinic on 6/15/2022. He may return to work on 6/16/2022. Please excuse him from work on 6/12/2022 and 6/15/85619. If you have any questions or concerns, please don't hesitate to call.     Sincerely,         Josep Hickey MD

## 2022-06-15 NOTE — PROGRESS NOTES
[address] [place] [address]  [address] [place] [facility] [address]  Dept: [address]: [address] (:  1975) is a 48 y.o. male, here for evaluation of the following chief complaint(s):  Diarrhea (ongoing since Saturday, did have a family reunion and feels that he may have eaten something there, some form to stools, at least 4 episodes yesterday)      ASSESSMENT/PLAN:  1. Acute gastroenteritis    [place] diet  Push fluids  Symptoms improving  Declines labs/testing  Work note given  Follow up if symptoms worsen or persist.   No follow-ups on file. SUBJECTIVE/OBJECTIVE:  Diarrhea   This is a new problem. The current episode started in the past 7 days. The problem occurs 5 to 10 times per day. The problem has been resolved. The stool consistency is described as watery. The patient states that diarrhea does not awaken him from sleep. Associated symptoms include bloating (over last few days, but now resolved) and increased flatus. Pertinent negatives include no abdominal pain, arthralgias, chills, coughing, fever, headaches, myalgias, sweats, URI, vomiting or weight loss. Exacerbated by: certain foods. has \"sensistive stomach\" Risk factors include suspect food intake. He has tried anti-motility drug and increased fluids for the symptoms. The treatment provided significant relief. His past medical history is significant for irritable bowel syndrome. There is no history of bowel resection, inflammatory bowel disease, malabsorption, a recent abdominal surgery or short gut syndrome. Review of Systems   Constitutional: Positive for activity change, appetite change and fatigue. Negative for chills, fever and weight loss. HENT: Negative for congestion, rhinorrhea and sore throat. Respiratory: Negative for cough, shortness of breath and wheezing. Cardiovascular: Negative for chest pain and palpitations.

## 2022-06-22 ENCOUNTER — TELEPHONE (OUTPATIENT)
Dept: FAMILY MEDICINE CLINIC | Age: 47
End: 2022-06-22

## 2022-06-22 ENCOUNTER — HOSPITAL ENCOUNTER (EMERGENCY)
Age: 47
Discharge: HOME | End: 2022-06-22
Attending: EMERGENCY MEDICINE
Payer: COMMERCIAL

## 2022-06-22 VITALS
HEIGHT: 71 IN | RESPIRATION RATE: 16 BRPM | SYSTOLIC BLOOD PRESSURE: 121 MMHG | DIASTOLIC BLOOD PRESSURE: 79 MMHG | OXYGEN SATURATION: 96 % | HEART RATE: 66 BPM | WEIGHT: 245 LBS | BODY MASS INDEX: 34.3 KG/M2 | TEMPERATURE: 98.5 F

## 2022-06-22 DIAGNOSIS — R19.7 DIARRHEA, UNSPECIFIED TYPE: Primary | ICD-10-CM

## 2022-06-22 PROCEDURE — 99213 OFFICE O/P EST LOW 20 MIN: CPT

## 2022-06-22 PROCEDURE — 99213 OFFICE O/P EST LOW 20 MIN: CPT | Performed by: EMERGENCY MEDICINE

## 2022-06-22 ASSESSMENT — ENCOUNTER SYMPTOMS
COUGH: 0
SINUS PRESSURE: 0
NAUSEA: 0
DIARRHEA: 1
VOMITING: 0
ABDOMINAL PAIN: 1
BACK PAIN: 0
EYE PAIN: 0
EYE DISCHARGE: 0
SORE THROAT: 0
WHEEZING: 0
STRIDOR: 0
TROUBLE SWALLOWING: 0
ROS SKIN COMMENTS: NO RASH OR BRUISING
EYE REDNESS: 0
VOICE CHANGE: 0
SHORTNESS OF BREATH: 0

## 2022-06-22 ASSESSMENT — PAIN - FUNCTIONAL ASSESSMENT: PAIN_FUNCTIONAL_ASSESSMENT: NONE - DENIES PAIN

## 2022-06-22 NOTE — ED TRIAGE NOTES
Pt presents to [facility] with c/o diarrhea x 1.5 weeks. Pt denies fevers or emesis. Pt states he is able to hold fluids down well.

## 2022-06-22 NOTE — Clinical Note
Pradip Serra was seen and treated in our emergency department on 6/22/2022. He may return to work on 06/24/2022. No work June 22 and June 23, 2022     If you have any questions or concerns, please don't hesitate to call.       Jeana Patrick MD

## 2022-06-22 NOTE — ED PROVIDER NOTES
[address] [facility] Encounter      [facility]       Chief Complaint   Patient presents with    Diarrhea       Nurses Notes reviewed and I agree except as noted in the HPI. HISTORY OF PRESENT ILLNESS   Lora Seth is a 48 y.o. male who presents with abdominal pain and diarrhea. Diarrhea has been present for 10 days, onset after he may have eaten some tainted food at a family reunion. He has cramping intermittent periumbilical abdominal pain but he denies pain at this time. No nausea or vomiting, blood in stool, weight loss, dizziness, syncope, chest pain, shortness of breath. No travel out of the country. No ingestion of well water. Previous history of irritable bowel syndrome, no history of colitis or inflammatory bowel disease. Colonoscopy normal 5 years prior. Non-smoker, no DM. REVIEW OF SYSTEMS     Review of Systems   Constitutional: Negative for appetite change, chills, fatigue, fever and unexpected weight change. Decreased appetite no fever   HENT: Negative for congestion, ear discharge, ear pain, sinus pressure, sneezing, sore throat, trouble swallowing and voice change. No upper respiratory symptoms   Eyes: Negative for pain, discharge and redness. No redness or drainage   Respiratory: Negative for cough, shortness of breath, wheezing and stridor. No cough or shortness of breath   Cardiovascular: Negative for chest pain and leg swelling. Gastrointestinal: Positive for abdominal pain and diarrhea. Negative for nausea and vomiting. Cramping abdominal pain and nonbloody diarrhea   Endocrine:        No weight loss   Genitourinary: Negative for dysuria, frequency, hematuria and urgency. No  symptoms   Musculoskeletal: Negative for arthralgias, back pain, myalgias and neck pain. Skin: Negative for rash. No rash or bruising   Neurological: Negative for dizziness, syncope, weakness and headaches.         No headache or lethargy   Hematological: Negative for adenopathy. Psychiatric/Behavioral: Negative for behavioral problems, confusion, sleep disturbance and suicidal ideas. The patient is not nervous/anxious. red and bold elements reviewed    PAST MEDICAL HISTORY         Diagnosis Date    GERD (gastroesophageal reflux disease)     IBS (irritable bowel syndrome)        SURGICAL HISTORY     Patient  has a past surgical history that includes back surgery. CURRENT MEDICATIONS       Discharge Medication List as of 6/22/2022  9:07 AM      CONTINUE these medications which have NOT CHANGED    Details   Esomeprazole Magnesium (NEXIUM PO) Take by mouthHistorical Med      FLUoxetine (PROZAC) 20 MG capsule Take 1 capsule by mouth daily, Disp-30 capsule, R-5Normal      omeprazole (PRILOSEC) 40 MG delayed release capsule Take 1 capsule by mouth daily (with breakfast), Disp-30 capsule, R-11Normal      loperamide (IMODIUM) 2 MG capsule Take 2 mg by mouth 4 times daily as needed for DiarrheaHistorical Med             ALLERGIES     Patient is is allergic to pcn [penicillins]. FAMILY HISTORY     Patient'sfamily history includes High Blood Pressure in his brother and mother; Other in his father. SOCIAL HISTORY     Patient  reports that he has never smoked. He has never used smokeless tobacco. He reports that he does not drink alcohol and does not use drugs. PHYSICAL EXAM     ED TRIAGE VITALS  BP: 121/79, Temp: 98.5 °F (36.9 °C), Heart Rate: 66, Resp: 16, SpO2: 96 %  Physical Exam  Vitals and nursing note reviewed. Constitutional:       General: He is not in acute distress. Appearance: He is well-developed. He is not ill-appearing. Comments: Moist membranes nonicteric   HENT:      Head: Normocephalic and atraumatic.       Right Ear: Tympanic membrane and external ear normal.      Left Ear: Tympanic membrane and external ear normal.      Nose: Nose normal.      Mouth/Throat:      Pharynx: No oropharyngeal exudate. Comments: Oropharynx normal  Eyes:      General: No scleral icterus. Right eye: No discharge. Left eye: No discharge. Extraocular Movements:      Right eye: Normal extraocular motion. Left eye: Normal extraocular motion. Conjunctiva/sclera: Conjunctivae normal.      Pupils: Pupils are equal, round, and reactive to light. Comments: Conjunctiva clear nonicteric   Neck:      Thyroid: No thyromegaly. Vascular: No JVD. Comments: No meningismus  Cardiovascular:      Rate and Rhythm: Normal rate and regular rhythm. Pulses: Normal pulses. Heart sounds: Normal heart sounds, S1 normal and S2 normal. No murmur heard. No friction rub. No gallop. Comments: No murmur  Pulmonary:      Effort: Pulmonary effort is normal. No tachypnea or respiratory distress. Breath sounds: Normal breath sounds. No stridor. No decreased breath sounds, wheezing, rhonchi or rales. Comments: No cough lungs clear  Chest:      Chest wall: No tenderness. Abdominal:      General: Bowel sounds are normal. There is no distension. Palpations: Abdomen is soft. There is no mass. Tenderness: There is no abdominal tenderness. There is no right CVA tenderness, left CVA tenderness, guarding or rebound. Comments: Soft nontender bowel sounds active. Musculoskeletal:         General: No tenderness. Normal range of motion. Cervical back: Normal range of motion. Comments: Extremities normal   Lymphadenopathy:      Cervical: No cervical adenopathy. Right cervical: No superficial cervical adenopathy. Left cervical: No superficial cervical adenopathy. Skin:     General: Skin is warm and dry. Findings: No erythema or rash. Comments: No rash or bruising on examined areas   Neurological:      Mental Status: He is alert and oriented to person, place, and time. Cranial Nerves: No cranial nerve deficit. Motor: No abnormal muscle tone. Coordination: Coordination normal.      Deep Tendon Reflexes: Reflexes are normal and symmetric. Reflexes normal.      Comments: Appropriate no focal finding   Psychiatric:         Behavior: Behavior normal.         Thought Content: Thought content normal.         Judgment: Judgment normal.         DIAGNOSTIC RESULTS   Labs: No results found for this visit on 06/22/22. IMAGING:  No orders to display     URGENT CARE COURSE:     Vitals:    06/22/22 0855   BP: 121/79   Pulse: 66   Resp: 16   Temp: 98.5 °F (36.9 °C)   TempSrc: Temporal   SpO2: 96%   Weight: 245 lb (111.1 kg)   Height: 5' 10.5\" (1.791 m)       Medications - No data to display  PROCEDURES:  None  FINALIMPRESSION      1. Diarrhea, unspecified type        DISPOSITION/PLAN   DISPOSITION Decision To Discharge 06/22/2022 09:06:18 AM  Nontoxic, well-hydrated, normal airway. No sepsis or CNS infection. Abdomen nonsurgical.  Patient has diarrhea of unknown etiology. Unable to provide stool sample at this time. No GI bleed. Patient is to be tested today for enteric pathogens by PCR. Patient to follow-up with PCP in the next 2 days to review test results. Patient to rest in cool air conditioned space with clear liquids only and Tylenol for fever and pain.   Patient understands to go to ED if worse  PATIENT REFERRED TO:  CHYNA Lynch - CNP  [address] [place] [phone]    In 2 days  Recheck in office, go to emergency if worse    DISCHARGE MEDICATIONS:  Discharge Medication List as of 6/22/2022  9:07 AM        Discharge Medication List as of 6/22/2022  9:07 AM          MD Frances Garcia MD  06/22/22 0718

## 2022-07-06 ENCOUNTER — PATIENT MESSAGE (OUTPATIENT)
Dept: FAMILY MEDICINE CLINIC | Age: 47
End: 2022-07-06

## 2022-07-06 RX ORDER — ESOMEPRAZOLE MAGNESIUM 40 MG/1
40 CAPSULE, DELAYED RELEASE ORAL
Qty: 90 CAPSULE | Refills: 3 | Status: SHIPPED | OUTPATIENT
Start: 2022-07-06

## 2022-07-07 ENCOUNTER — PROCEDURE VISIT (OUTPATIENT)
Dept: FAMILY MEDICINE CLINIC | Age: 47
End: 2022-07-07
Payer: COMMERCIAL

## 2022-07-07 VITALS
HEART RATE: 66 BPM | SYSTOLIC BLOOD PRESSURE: 138 MMHG | BODY MASS INDEX: 35.79 KG/M2 | WEIGHT: 253 LBS | RESPIRATION RATE: 18 BRPM | DIASTOLIC BLOOD PRESSURE: 94 MMHG | OXYGEN SATURATION: 96 %

## 2022-07-07 DIAGNOSIS — L91.8 INFLAMED SKIN TAG: Primary | ICD-10-CM

## 2022-07-07 PROCEDURE — 99213 OFFICE O/P EST LOW 20 MIN: CPT | Performed by: NURSE PRACTITIONER

## 2022-07-07 PROCEDURE — 11200 RMVL SKIN TAGS UP TO&INC 15: CPT | Performed by: NURSE PRACTITIONER

## 2022-07-07 NOTE — PROGRESS NOTES
Clara Schrader (:  1975) is a 48 y.o. male,Established patient, here for evaluation of the following chief complaint(s): Other (skin tag removal)         ASSESSMENT/PLAN:  1. Inflamed skin tag  -     NH REMOVAL OF SKIN TAGS, UP TO 15    Skin tag removal.  Consent obtained. Prepped. 3 skin skin tags anesthestized with 1% plain lidocaine. Skin tags removed and bleeding controlled with bovie pen. Antibiotic ointment applied. Monitor for infection. Return if symptoms worsen or fail to improve. Subjective   SUBJECTIVE/OBJECTIVE:  HPI  Skin tag on mid back and two around shirt color. Bothering him and painful at times. One on back is getting big and shirt rubs it all day. Review of Systems   Skin:        Painful skin tags          Objective   Physical Exam  Skin:     Comments: Red irritated skin tags present. 1 on mid back and 2 on around shirt collar line. On this date 2022 I have spent 25 minutes reviewing previous notes, test results and face to face with the patient discussing the diagnosis and importance of compliance with the treatment plan as well as documenting on the day of the visit. An electronic signature was used to authenticate this note.     --Ajit Wyman, CHYNA - CNP

## 2022-07-21 ENCOUNTER — HOSPITAL ENCOUNTER (EMERGENCY)
Age: 47
Discharge: HOME | End: 2022-07-21
Payer: COMMERCIAL

## 2022-07-21 VITALS
SYSTOLIC BLOOD PRESSURE: 162 MMHG | BODY MASS INDEX: 35 KG/M2 | RESPIRATION RATE: 16 BRPM | WEIGHT: 250 LBS | OXYGEN SATURATION: 95 % | HEART RATE: 86 BPM | HEIGHT: 71 IN | DIASTOLIC BLOOD PRESSURE: 95 MMHG | TEMPERATURE: 96.5 F

## 2022-07-21 DIAGNOSIS — Z02.89 ENCOUNTER TO OBTAIN EXCUSE FROM WORK: ICD-10-CM

## 2022-07-21 DIAGNOSIS — K21.9 GASTROESOPHAGEAL REFLUX DISEASE WITHOUT ESOPHAGITIS: Primary | ICD-10-CM

## 2022-07-21 PROCEDURE — 99213 OFFICE O/P EST LOW 20 MIN: CPT

## 2022-07-21 PROCEDURE — 99213 OFFICE O/P EST LOW 20 MIN: CPT | Performed by: NURSE PRACTITIONER

## 2022-07-21 ASSESSMENT — ENCOUNTER SYMPTOMS
RHINORRHEA: 0
NAUSEA: 1
VOMITING: 1
SORE THROAT: 0
ABDOMINAL PAIN: 0
SHORTNESS OF BREATH: 0
DIARRHEA: 0
COUGH: 0
EYE REDNESS: 0
EYE DISCHARGE: 0
TROUBLE SWALLOWING: 0
ABDOMINAL DISTENTION: 0

## 2022-07-21 ASSESSMENT — PAIN SCALES - GENERAL: PAINLEVEL_OUTOF10: 0

## 2022-07-21 ASSESSMENT — PAIN - FUNCTIONAL ASSESSMENT: PAIN_FUNCTIONAL_ASSESSMENT: 0-10

## 2022-07-21 NOTE — ED TRIAGE NOTES
Pt had acid reflux last night-has since resolved. Needs work slip for today. Pt states he has a hx of acid reflux and ate food that had jalapeno's causing flare up of GERD.

## 2022-07-21 NOTE — ED PROVIDER NOTES
[address]       Chief Complaint   Patient presents with    Letter for School/Work     Pt had acid reflux last night-has since resolved. Needs work slip for today. Nurses Notes reviewed and I agree except as noted in the HPI. HISTORY OF PRESENT ILLNESS   Lizy Hull is a 48 y.o. male who presents for evaluation of GERD. Onset of symptoms yesterday, resolved. Patient states he unknowingly ate something that typically triggers his acid reflux. Patient complained of nausea/vomiting and intense esophageal pain due to acid reflux. History of EGD. No Mack's esophagus. No trouble swallowing. Requesting work excuse for today. REVIEW OF SYSTEMS     Review of Systems   Constitutional:  Negative for chills, diaphoresis, fatigue and fever. HENT:  Negative for congestion, ear pain, rhinorrhea, sore throat and trouble swallowing. Eyes:  Negative for discharge and redness. Respiratory:  Negative for cough and shortness of breath. Cardiovascular:  Negative for chest pain. Gastrointestinal:  Positive for nausea and vomiting. Negative for abdominal distention, abdominal pain and diarrhea. Genitourinary:  Negative for decreased urine volume. Musculoskeletal:  Negative for neck pain and neck stiffness. Skin:  Negative for rash. Neurological:  Negative for headaches. Hematological:  Negative for adenopathy. Psychiatric/Behavioral:  Negative for sleep disturbance. PAST MEDICAL HISTORY         Diagnosis Date    GERD (gastroesophageal reflux disease)     IBS (irritable bowel syndrome)        SURGICAL HISTORY     Patient  has a past surgical history that includes back surgery.     CURRENT MEDICATIONS       Previous Medications    ESOMEPRAZOLE (NEXIUM) 40 MG DELAYED RELEASE CAPSULE    Take 1 capsule by mouth every morning (before breakfast)    FLUOXETINE (PROZAC) 20 MG CAPSULE    Take 1 capsule by mouth daily    LOPERAMIDE (IMODIUM) 2 MG CAPSULE    Take 2 mg by mouth 4 times daily as needed for Diarrhea    OMEPRAZOLE (PRILOSEC) 40 MG DELAYED RELEASE CAPSULE    Take 1 capsule by mouth daily (with breakfast)       ALLERGIES     Patient is is allergic to pcn [penicillins]. FAMILY HISTORY     Patient'sfamily history includes High Blood Pressure in his brother and mother; Other in his father. SOCIAL HISTORY     Patient  reports that he has never smoked. He has never used smokeless tobacco. He reports that he does not drink alcohol and does not use drugs. PHYSICAL EXAM     ED TRIAGE VITALS  BP: (!) 162/95, Temp: (!) 96.5 °F (35.8 °C), Heart Rate: 86, Resp: 16, SpO2: 95 %  Physical Exam  Vitals and nursing note reviewed. Constitutional:       General: He is not in acute distress. Appearance: Normal appearance. He is well-developed. He is not ill-appearing, toxic-appearing or diaphoretic. HENT:      Head: Normocephalic and atraumatic. Jaw: No trismus. Right Ear: Hearing, tympanic membrane, ear canal and external ear normal. No mastoid tenderness. No hemotympanum. Tympanic membrane is not perforated, erythematous or bulging. Left Ear: Hearing, tympanic membrane, ear canal and external ear normal. No mastoid tenderness. No hemotympanum. Tympanic membrane is not perforated, erythematous or bulging. Nose: Nose normal.      Mouth/Throat:      Mouth: Mucous membranes are moist.      Pharynx: Oropharynx is clear. Uvula midline. Tonsils: No tonsillar abscesses. Eyes:      General: No scleral icterus. Conjunctiva/sclera: Conjunctivae normal.   Neck:      Thyroid: No thyromegaly. Trachea: Trachea normal.   Cardiovascular:      Rate and Rhythm: Normal rate and regular rhythm. No extrasystoles are present. Chest Wall: PMI is not displaced. Heart sounds: Normal heart sounds. No murmur heard. No friction rub. No gallop.    Pulmonary:      Effort: Pulmonary effort is normal. No accessory muscle usage or respiratory distress. Breath sounds: Normal breath sounds. Chest:   Breasts:     Right: No supraclavicular adenopathy. Left: No supraclavicular adenopathy. Musculoskeletal:      Cervical back: Normal range of motion and neck supple. Lymphadenopathy:      Head:      Right side of head: No submental, submandibular, tonsillar, preauricular, posterior auricular or occipital adenopathy. Left side of head: No submental, submandibular, tonsillar, preauricular, posterior auricular or occipital adenopathy. Cervical: No cervical adenopathy. Upper Body:      Right upper body: No supraclavicular adenopathy. Left upper body: No supraclavicular adenopathy. Skin:     General: Skin is warm and dry. Coloration: Skin is not pale. Findings: No rash. Comments: Skin intact, warm and dry to touch, no rashes noted on exposed surfaces. Neurological:      Mental Status: He is alert and oriented to person, place, and time. He is not disoriented. Psychiatric:         Mood and Affect: Mood normal.         Behavior: Behavior is cooperative. DIAGNOSTIC RESULTS   Labs: No results found for this visit on 07/21/22. IMAGING:  No orders to display     URGENT CARE COURSE:     Vitals:    07/21/22 1326   BP: (!) 162/95   Pulse: 86   Resp: 16   Temp: (!) 96.5 °F (35.8 °C)   TempSrc: Temporal   SpO2: 95%   Weight: 250 lb (113.4 kg)   Height: 5' 11\" (1.803 m)       Medications - No data to display  PROCEDURES:  None  FINALIMPRESSION      1. Gastroesophageal reflux disease without esophagitis    2. Encounter to obtain excuse from work        DISPOSITION/PLAN   DISPOSITION Decision To Discharge 07/21/2022 01:37:54 PM  Continue current treatment. Follow-up with PCP as needed. PATIENT REFERRED TO:  CHYNA Uribe - CNP  [address] [place] [id number] [phone]      Follow-up as needed. Continue current treatment. Diet as tolerated.   If symptoms worsen go to ER.    DISCHARGE MEDICATIONS:  New Prescriptions    No medications on file     Current Discharge Medication List          [address], APRN - CNP  07/21/22 6715

## 2022-07-25 ENCOUNTER — TELEPHONE (OUTPATIENT)
Dept: FAMILY MEDICINE CLINIC | Age: 47
End: 2022-07-25

## 2022-08-13 NOTE — Clinical Note
Capri Contreras was seen and treated in our emergency department on 7/21/2022. He may return to work on 07/22/2022. If you have any questions or concerns, please don't hesitate to call.       Forest Ward, CHYNA - CNP
Yes

## 2022-08-23 ENCOUNTER — OFFICE VISIT (OUTPATIENT)
Dept: FAMILY MEDICINE CLINIC | Age: 47
End: 2022-08-23
Payer: COMMERCIAL

## 2022-08-23 VITALS
OXYGEN SATURATION: 99 % | HEIGHT: 71 IN | SYSTOLIC BLOOD PRESSURE: 118 MMHG | TEMPERATURE: 97.3 F | HEART RATE: 68 BPM | DIASTOLIC BLOOD PRESSURE: 76 MMHG | WEIGHT: 250 LBS | RESPIRATION RATE: 18 BRPM | BODY MASS INDEX: 35 KG/M2

## 2022-08-23 DIAGNOSIS — K21.9 GASTROESOPHAGEAL REFLUX DISEASE WITHOUT ESOPHAGITIS: ICD-10-CM

## 2022-08-23 DIAGNOSIS — F41.9 ANXIETY: Primary | ICD-10-CM

## 2022-08-23 PROCEDURE — 99214 OFFICE O/P EST MOD 30 MIN: CPT | Performed by: NURSE PRACTITIONER

## 2022-08-23 RX ORDER — FLUOXETINE HYDROCHLORIDE 20 MG/1
20 CAPSULE ORAL DAILY
Qty: 30 CAPSULE | Refills: 11 | Status: SHIPPED | OUTPATIENT
Start: 2022-08-23

## 2022-08-23 SDOH — ECONOMIC STABILITY: FOOD INSECURITY: WITHIN THE PAST 12 MONTHS, THE FOOD YOU BOUGHT JUST DIDN'T LAST AND YOU DIDN'T HAVE MONEY TO GET MORE.: NEVER TRUE

## 2022-08-23 SDOH — ECONOMIC STABILITY: FOOD INSECURITY: WITHIN THE PAST 12 MONTHS, YOU WORRIED THAT YOUR FOOD WOULD RUN OUT BEFORE YOU GOT MONEY TO BUY MORE.: NEVER TRUE

## 2022-08-23 ASSESSMENT — ENCOUNTER SYMPTOMS
CHEST TIGHTNESS: 0
SORE THROAT: 0
SHORTNESS OF BREATH: 0
ABDOMINAL PAIN: 0
STRIDOR: 0
ABDOMINAL DISTENTION: 0
COUGH: 0
VOMITING: 0
COLOR CHANGE: 0
NAUSEA: 0
DIARRHEA: 0
CONSTIPATION: 0
WHEEZING: 0
SINUS PRESSURE: 0
BACK PAIN: 0

## 2022-08-23 ASSESSMENT — SOCIAL DETERMINANTS OF HEALTH (SDOH): HOW HARD IS IT FOR YOU TO PAY FOR THE VERY BASICS LIKE FOOD, HOUSING, MEDICAL CARE, AND HEATING?: NOT HARD AT ALL

## 2022-09-19 ENCOUNTER — OFFICE VISIT (OUTPATIENT)
Dept: FAMILY MEDICINE CLINIC | Age: 47
End: 2022-09-19
Payer: COMMERCIAL

## 2022-09-19 VITALS
SYSTOLIC BLOOD PRESSURE: 128 MMHG | HEART RATE: 88 BPM | DIASTOLIC BLOOD PRESSURE: 76 MMHG | RESPIRATION RATE: 18 BRPM | WEIGHT: 256 LBS | OXYGEN SATURATION: 98 % | BODY MASS INDEX: 35.84 KG/M2 | HEIGHT: 71 IN

## 2022-09-19 DIAGNOSIS — M79.672 PAIN OF LEFT HEEL: Primary | ICD-10-CM

## 2022-09-19 DIAGNOSIS — M72.2 PLANTAR FASCIITIS: ICD-10-CM

## 2022-09-19 PROCEDURE — 99213 OFFICE O/P EST LOW 20 MIN: CPT | Performed by: NURSE PRACTITIONER

## 2022-09-19 RX ORDER — METHYLPREDNISOLONE 4 MG/1
TABLET ORAL
Qty: 1 KIT | Refills: 0 | Status: SHIPPED | OUTPATIENT
Start: 2022-09-19 | End: 2022-09-25

## 2022-09-19 NOTE — PROGRESS NOTES
Duane Davies (:  1975) is a 50 y.o. male,Established patient, here for evaluation of the following chief complaint(s): Foot Pain (LT heel pain; on and off; pain decreases with movement, recently pain increased )         ASSESSMENT/PLAN:  1. Pain of left heel  -     XR CALCANEUS LEFT (MIN 2 VIEWS); Future  2. Plantar fasciitis    Good shoe inserts  Motrin  Obtain xray    Return if symptoms worsen or fail to improve. Subjective   SUBJECTIVE/OBJECTIVE:  HPI    Left heel pain. Started couple years ago. Hurts worse he initially gets up and moves. Comes and goes. Getting worse. Review of Systems   Musculoskeletal:  Positive for arthralgias and myalgias. Objective   Physical Exam  Musculoskeletal:         General: Tenderness present. No swelling, deformity or signs of injury. Normal range of motion. On this date 2022 I have spent 23 minutes reviewing previous notes, test results and face to face with the patient discussing the diagnosis and importance of compliance with the treatment plan as well as documenting on the day of the visit. An electronic signature was used to authenticate this note.     --CHYNA Boyce - CNP

## 2022-10-04 ENCOUNTER — OFFICE VISIT (OUTPATIENT)
Dept: FAMILY MEDICINE CLINIC | Age: 47
End: 2022-10-04
Payer: COMMERCIAL

## 2022-10-04 VITALS
SYSTOLIC BLOOD PRESSURE: 130 MMHG | OXYGEN SATURATION: 95 % | DIASTOLIC BLOOD PRESSURE: 88 MMHG | RESPIRATION RATE: 22 BRPM | HEART RATE: 62 BPM | WEIGHT: 250 LBS | BODY MASS INDEX: 35 KG/M2 | HEIGHT: 71 IN

## 2022-10-04 DIAGNOSIS — L29.9 PRURITIC DERMATITIS: Primary | ICD-10-CM

## 2022-10-04 PROCEDURE — 96372 THER/PROPH/DIAG INJ SC/IM: CPT | Performed by: NURSE PRACTITIONER

## 2022-10-04 PROCEDURE — 99213 OFFICE O/P EST LOW 20 MIN: CPT | Performed by: NURSE PRACTITIONER

## 2022-10-04 RX ORDER — TRIAMCINOLONE ACETONIDE 40 MG/ML
80 INJECTION, SUSPENSION INTRA-ARTICULAR; INTRAMUSCULAR ONCE
Status: COMPLETED | OUTPATIENT
Start: 2022-10-04 | End: 2022-10-04

## 2022-10-04 RX ORDER — HYDROXYZINE HYDROCHLORIDE 25 MG/1
25 TABLET, FILM COATED ORAL EVERY 8 HOURS PRN
Qty: 30 TABLET | Refills: 0 | Status: SHIPPED | OUTPATIENT
Start: 2022-10-04 | End: 2022-10-14

## 2022-10-04 RX ADMIN — TRIAMCINOLONE ACETONIDE 80 MG: 40 INJECTION, SUSPENSION INTRA-ARTICULAR; INTRAMUSCULAR at 14:54

## 2022-10-04 ASSESSMENT — ENCOUNTER SYMPTOMS
VOMITING: 0
ABDOMINAL DISTENTION: 0
SHORTNESS OF BREATH: 0
NAUSEA: 0
CONSTIPATION: 0
WHEEZING: 0
COLOR CHANGE: 0
COUGH: 0
ABDOMINAL PAIN: 0
SINUS PRESSURE: 0
STRIDOR: 0
CHEST TIGHTNESS: 0
SORE THROAT: 0
DIARRHEA: 0
BACK PAIN: 0

## 2022-10-04 NOTE — PROGRESS NOTES
Administrations This Visit       triamcinolone acetonide (KENALOG-40) injection 80 mg       Admin Date  10/04/2022 Action  Given Dose  80 mg Route  IntraMUSCular Administered By  [employer], [facility]

## 2022-10-04 NOTE — PROGRESS NOTES
Bianca Martinez (:  1975) is a 50 y.o. male,Established patient, here for evaluation of the following chief complaint(s):  Rash (Itchy breaking out in hives started when weather got colder)         ASSESSMENT/PLAN:  1. Pruritic dermatitis  -     triamcinolone acetonide (KENALOG-40) injection 80 mg; 80 mg, IntraMUSCular, ONCE, 1 dose, On Tue 10/4/22 at 1515  -     hydrOXYzine HCl (ATARAX) 25 MG tablet; Take 1 tablet by mouth every 8 hours as needed for Itching, Disp-30 tablet, R-0Normal    Kenalog injection given  Atarax for itching  Try use lotion since air is dry    Return if symptoms worsen or fail to improve. Subjective   SUBJECTIVE/OBJECTIVE:  HPI    Rash. Itchy. Breaking out. Started 1 1/2 weeks ago. Not putting anything on it. Gets this every year and gets a shot for it. Review of Systems   Constitutional:  Negative for activity change, appetite change, chills, diaphoresis, fatigue, fever and unexpected weight change. HENT:  Negative for congestion, ear pain, postnasal drip, sinus pressure and sore throat. Eyes:  Negative for visual disturbance. Respiratory:  Negative for cough, chest tightness, shortness of breath, wheezing and stridor. Cardiovascular:  Negative for chest pain, palpitations and leg swelling. Gastrointestinal:  Negative for abdominal distention, abdominal pain, constipation, diarrhea, nausea and vomiting. Endocrine: Negative for polydipsia, polyphagia and polyuria. Genitourinary:  Negative for decreased urine volume, difficulty urinating, dysuria, flank pain, frequency, hematuria and urgency. Musculoskeletal:  Negative for arthralgias, back pain, gait problem, joint swelling, myalgias and neck pain. Skin:  Positive for rash. Negative for color change and pallor. Neurological:  Negative for dizziness, syncope, weakness, light-headedness, numbness and headaches. Hematological:  Negative for adenopathy.    Psychiatric/Behavioral:  Negative for behavioral problems, self-injury and sleep disturbance. The patient is not nervous/anxious. Objective   Physical Exam  Constitutional:       Appearance: Normal appearance. HENT:      Head: Normocephalic and atraumatic. Cardiovascular:      Rate and Rhythm: Normal rate. Pulmonary:      Effort: Pulmonary effort is normal.   Musculoskeletal:      Cervical back: Normal range of motion and neck supple. Skin:     General: Skin is warm and dry. Findings: Rash present. Neurological:      Mental Status: He is alert and oriented to person, place, and time. On this date 10/4/2022 I have spent 25 minutes reviewing previous notes, test results and face to face with the patient discussing the diagnosis and importance of compliance with the treatment plan as well as documenting on the day of the visit. An electronic signature was used to authenticate this note.     --CHYNA Munoz - CNP

## 2022-11-22 ENCOUNTER — HOSPITAL ENCOUNTER (OUTPATIENT)
Age: 47
Discharge: HOME | End: 2022-11-22
Payer: COMMERCIAL

## 2022-11-22 ENCOUNTER — OFFICE VISIT (OUTPATIENT)
Dept: FAMILY MEDICINE CLINIC | Age: 47
End: 2022-11-22
Payer: COMMERCIAL

## 2022-11-22 ENCOUNTER — HOSPITAL ENCOUNTER (OUTPATIENT)
Dept: GENERAL RADIOLOGY | Age: 47
Discharge: HOME | End: 2022-11-22
Payer: COMMERCIAL

## 2022-11-22 VITALS
BODY MASS INDEX: 34.31 KG/M2 | RESPIRATION RATE: 20 BRPM | OXYGEN SATURATION: 96 % | HEART RATE: 106 BPM | WEIGHT: 246 LBS | DIASTOLIC BLOOD PRESSURE: 106 MMHG | SYSTOLIC BLOOD PRESSURE: 148 MMHG

## 2022-11-22 DIAGNOSIS — R00.0 TACHYCARDIA: ICD-10-CM

## 2022-11-22 DIAGNOSIS — R61 DIAPHORESIS: ICD-10-CM

## 2022-11-22 DIAGNOSIS — R05.1 ACUTE COUGH: ICD-10-CM

## 2022-11-22 DIAGNOSIS — F41.9 ANXIETY: Primary | ICD-10-CM

## 2022-11-22 LAB
EKG ATRIAL RATE: 88 BPM
EKG P-R INTERVAL: 178 MS
EKG Q-T INTERVAL: 340 MS
EKG QRS DURATION: 90 MS
EKG QTC CALCULATION (BAZETT): 411 MS
EKG R AXIS: 6 DEGREES
EKG T AXIS: 111 DEGREES
EKG VENTRICULAR RATE: 88 BPM
INFLUENZA VIRUS A RNA: NEGATIVE
INFLUENZA VIRUS B RNA: NEGATIVE
Lab: NORMAL
QC PASS/FAIL: NORMAL
SARS-COV-2 RDRP RESP QL NAA+PROBE: NEGATIVE

## 2022-11-22 PROCEDURE — 99214 OFFICE O/P EST MOD 30 MIN: CPT | Performed by: NURSE PRACTITIONER

## 2022-11-22 PROCEDURE — 87502 INFLUENZA DNA AMP PROBE: CPT | Performed by: NURSE PRACTITIONER

## 2022-11-22 PROCEDURE — 87635 SARS-COV-2 COVID-19 AMP PRB: CPT | Performed by: NURSE PRACTITIONER

## 2022-11-22 PROCEDURE — 71046 X-RAY EXAM CHEST 2 VIEWS: CPT

## 2022-11-22 PROCEDURE — 93005 ELECTROCARDIOGRAM TRACING: CPT | Performed by: NURSE PRACTITIONER

## 2022-11-22 RX ORDER — CEFDINIR 300 MG/1
300 CAPSULE ORAL 2 TIMES DAILY
Qty: 20 CAPSULE | Refills: 0 | Status: SHIPPED | OUTPATIENT
Start: 2022-11-22 | End: 2022-12-02

## 2022-11-22 RX ORDER — NYSTATIN 100000 U/G
OINTMENT TOPICAL
Qty: 30 G | Refills: 0 | Status: SHIPPED | OUTPATIENT
Start: 2022-11-22

## 2022-11-22 ASSESSMENT — ENCOUNTER SYMPTOMS
COUGH: 1
DIARRHEA: 0
CHEST TIGHTNESS: 0
SINUS PRESSURE: 0
ABDOMINAL PAIN: 0
WHEEZING: 0
SORE THROAT: 1
SHORTNESS OF BREATH: 0
ABDOMINAL DISTENTION: 0
VOMITING: 0
COLOR CHANGE: 0
BACK PAIN: 0
STRIDOR: 0
NAUSEA: 0
CONSTIPATION: 0

## 2022-11-22 NOTE — PROGRESS NOTES
Enrrique Miranda (:  1975) is a 50 y.o. male,Established patient, here for evaluation of the following chief complaint(s):  Congestion and Anxiety (Follow-up-)         ASSESSMENT/PLAN:  1. Anxiety  2. Acute cough  -     POCT COVID-19 Rapid, NAAT  -     POCT Influenza A/B DNA  -     XR CHEST (2 VW); Future  3. Diaphoresis  -     XR CHEST (2 VW); Future  -     EKG 12 Lead; Future  4. Tachycardia  -     XR CHEST (2 VW); Future  -     EKG 12 Lead; Future    Covid neg  Flu neg  Fluids  Rest  Tylenol   Otc symptomatic relievers  Obtain chest xray and EKG  Consider levaquin if ekg and chest xray normal    Anxiety doing good on prozac. No side effects. No changes in dose at this point. Continue to monitor for worsening symptoms. Return in about 1 year (around 2023). Subjective   SUBJECTIVE/OBJECTIVE:  HPI    Sweating. Ears hurting. Fatigue. Body aches. Started over the weekend. Little cough. Today a little worse. No sob. No chills. Wife tested pos for covid not to long ago. Anxiety doing good. Is still taking the prozac. No concerns. No depressed thoughts. No side effects. Review of Systems   Constitutional:  Positive for diaphoresis and fatigue. Negative for activity change, appetite change, chills, fever and unexpected weight change. HENT:  Positive for congestion, ear pain and sore throat. Negative for postnasal drip and sinus pressure. Eyes:  Negative for visual disturbance. Respiratory:  Positive for cough. Negative for chest tightness, shortness of breath, wheezing and stridor. Cardiovascular:  Negative for chest pain, palpitations and leg swelling. Gastrointestinal:  Negative for abdominal distention, abdominal pain, constipation, diarrhea, nausea and vomiting. Endocrine: Negative for polydipsia, polyphagia and polyuria.    Genitourinary:  Negative for decreased urine volume, difficulty urinating, dysuria, flank pain, frequency, hematuria and urgency. Musculoskeletal:  Positive for myalgias. Negative for arthralgias, back pain, gait problem, joint swelling and neck pain. Skin:  Negative for color change, pallor and rash. Neurological:  Negative for dizziness, syncope, weakness, light-headedness, numbness and headaches. Hematological:  Negative for adenopathy. Psychiatric/Behavioral:  Negative for behavioral problems, self-injury and sleep disturbance. The patient is not nervous/anxious. Objective   Physical Exam  Vitals reviewed. Constitutional:       General: He is not in acute distress. Appearance: Normal appearance. He is well-developed. HENT:      Head: Normocephalic and atraumatic. Right Ear: Tympanic membrane and external ear normal.      Left Ear: Tympanic membrane and external ear normal.      Nose: Congestion present. Right Sinus: No maxillary sinus tenderness. Left Sinus: No maxillary sinus tenderness. Mouth/Throat:      Mouth: Mucous membranes are moist.      Pharynx: Oropharynx is clear. Uvula midline. No oropharyngeal exudate or posterior oropharyngeal erythema. Neck:      Trachea: Trachea normal.   Cardiovascular:      Rate and Rhythm: Normal rate and regular rhythm. Heart sounds: Normal heart sounds. No murmur heard. Pulmonary:      Effort: Pulmonary effort is normal. No respiratory distress. Breath sounds: Normal breath sounds. No decreased breath sounds, wheezing, rhonchi or rales. Chest:      Chest wall: No tenderness. Abdominal:      General: There is no distension. Palpations: Abdomen is soft. There is no mass. Tenderness: There is no abdominal tenderness. Musculoskeletal:         General: No tenderness or deformity. Normal range of motion. Cervical back: Normal range of motion and neck supple. Lymphadenopathy:      Cervical: No cervical adenopathy. Skin:     General: Skin is warm and dry.    Neurological:      Mental Status: He is alert and oriented to person, place, and time. Motor: No abnormal muscle tone. Coordination: Coordination normal.      Gait: Gait normal.   Psychiatric:         Mood and Affect: Mood normal.         Behavior: Behavior normal.         Thought Content: Thought content normal.         Judgment: Judgment normal.          On this date 11/22/2022 I have spent 31 minutes reviewing previous notes, test results and face to face with the patient discussing the diagnosis and importance of compliance with the treatment plan as well as documenting on the day of the visit. An electronic signature was used to authenticate this note.     --CHYNA Nunez - CNP

## 2022-11-23 LAB
EKG ATRIAL RATE: 94 BPM
EKG P AXIS: 65 DEGREES
EKG P-R INTERVAL: 168 MS
EKG Q-T INTERVAL: 338 MS
EKG QRS DURATION: 94 MS
EKG QTC CALCULATION (BAZETT): 422 MS
EKG R AXIS: 21 DEGREES
EKG T AXIS: 50 DEGREES
EKG VENTRICULAR RATE: 94 BPM

## 2022-11-23 PROCEDURE — 93010 ELECTROCARDIOGRAM REPORT: CPT | Performed by: INTERNAL MEDICINE

## 2022-11-28 ENCOUNTER — OFFICE VISIT (OUTPATIENT)
Dept: FAMILY MEDICINE CLINIC | Age: 47
End: 2022-11-28
Payer: COMMERCIAL

## 2022-11-28 VITALS
HEART RATE: 102 BPM | RESPIRATION RATE: 20 BRPM | BODY MASS INDEX: 34.44 KG/M2 | OXYGEN SATURATION: 98 % | WEIGHT: 246 LBS | DIASTOLIC BLOOD PRESSURE: 84 MMHG | HEIGHT: 71 IN | SYSTOLIC BLOOD PRESSURE: 138 MMHG

## 2022-11-28 DIAGNOSIS — I10 PRIMARY HYPERTENSION: Primary | ICD-10-CM

## 2022-11-28 DIAGNOSIS — J01.00 ACUTE NON-RECURRENT MAXILLARY SINUSITIS: ICD-10-CM

## 2022-11-28 DIAGNOSIS — F41.9 ANXIETY: ICD-10-CM

## 2022-11-28 PROCEDURE — 3074F SYST BP LT 130 MM HG: CPT | Performed by: NURSE PRACTITIONER

## 2022-11-28 PROCEDURE — 99214 OFFICE O/P EST MOD 30 MIN: CPT | Performed by: NURSE PRACTITIONER

## 2022-11-28 PROCEDURE — 3078F DIAST BP <80 MM HG: CPT | Performed by: NURSE PRACTITIONER

## 2022-11-28 RX ORDER — LOSARTAN POTASSIUM 25 MG/1
25 TABLET ORAL DAILY
Qty: 90 TABLET | Refills: 1 | Status: SHIPPED | OUTPATIENT
Start: 2022-11-28

## 2022-11-28 RX ORDER — METHYLPREDNISOLONE 4 MG/1
TABLET ORAL
Qty: 1 KIT | Refills: 0 | Status: SHIPPED | OUTPATIENT
Start: 2022-11-28 | End: 2022-12-04

## 2022-11-28 ASSESSMENT — ENCOUNTER SYMPTOMS
ABDOMINAL PAIN: 0
WHEEZING: 0
STRIDOR: 0
SHORTNESS OF BREATH: 0
COUGH: 1
SINUS PRESSURE: 0
ABDOMINAL DISTENTION: 0
NAUSEA: 0
COLOR CHANGE: 0
CHEST TIGHTNESS: 0
DIARRHEA: 0
CONSTIPATION: 0
VOMITING: 0
BACK PAIN: 0
SORE THROAT: 0

## 2022-11-28 NOTE — PROGRESS NOTES
Lluvia Mccomrack (:  1975) is a 50 y.o. male,Established patient, here for evaluation of the following chief complaint(s):  2 Week Follow-Up (Cough is less, BP still high, still having ear pressure )         ASSESSMENT/PLAN:  1. Primary hypertension  2. Anxiety  3. Acute non-recurrent maxillary sinusitis    At this time we will go ahead and start him on a low-dose of losartan. We did consider a beta-blocker but possible fatigue side effect was a deterrents. He will monitor his blood pressure at work. He will call the office in a week. He will also monitor his heart rate. He will return here in 1 month for a recheck. If he notices heart rate staying elevated he needs to call the office. I did go ahead and give him a Medrol Dosepak and he is to continue with his Omnicef. He is to push plenty of fluids. Cont prozac for anxiety. I did fill out for for him to not work mandatory overtime for 3 months do to his anxiety and stress. Return in about 4 weeks (around 2022). Subjective   SUBJECTIVE/OBJECTIVE:  HPI    This is a 75-year-old male who I am seeing today for follow-up. The patient has been having a high blood pressure. At last visit he was high as well as at work he was high. He was sick though at that time. He had some cough congestion and fevers. His heart rate was running high as well. Denies any chest pain shortness of breath or leg swelling. He does feel a little bit better but still feeling pretty crummy. Does have lots of pressure in his ears and sinuses. He is a little dizzy yet. He has been on Omnicef for at least a week now. His blood pressure is still running a little high today. Same with his heart rate. He has no history of blood clots. Denies any leg swelling. He has been sick. Is any fevers. He is having lots of anxiety and stress from work as well. He is being mandated to work overtime hours. States this is taking a toll on him.              BP Readings from Last 3 Encounters:   11/28/22 138/84   11/22/22 (!) 148/106   10/04/22 130/88         Review of Systems   Constitutional:  Negative for activity change, appetite change, chills, diaphoresis, fatigue, fever and unexpected weight change. HENT:  Positive for congestion. Negative for ear pain, postnasal drip, sinus pressure and sore throat. Eyes:  Negative for visual disturbance. Respiratory:  Positive for cough. Negative for chest tightness, shortness of breath, wheezing and stridor. Cardiovascular:  Negative for chest pain, palpitations and leg swelling. Gastrointestinal:  Negative for abdominal distention, abdominal pain, constipation, diarrhea, nausea and vomiting. Endocrine: Negative for polydipsia, polyphagia and polyuria. Genitourinary:  Negative for decreased urine volume, difficulty urinating, dysuria, flank pain, frequency, hematuria and urgency. Musculoskeletal:  Negative for arthralgias, back pain, gait problem, joint swelling, myalgias and neck pain. Skin:  Negative for color change, pallor and rash. Neurological:  Positive for dizziness. Negative for syncope, weakness, light-headedness, numbness and headaches. Hematological:  Negative for adenopathy. Psychiatric/Behavioral:  Negative for behavioral problems, self-injury and sleep disturbance. The patient is not nervous/anxious. Objective   Physical Exam  Vitals reviewed. Constitutional:       General: He is not in acute distress. Appearance: Normal appearance. He is well-developed. HENT:      Head: Normocephalic. Right Ear: External ear normal.      Left Ear: External ear normal.      Nose: Nose normal.      Right Sinus: No maxillary sinus tenderness. Left Sinus: No maxillary sinus tenderness. Mouth/Throat:      Pharynx: Uvula midline. Neck:      Trachea: Trachea normal.   Cardiovascular:      Rate and Rhythm: Normal rate and regular rhythm. Heart sounds: Normal heart sounds.  No murmur heard. Pulmonary:      Effort: Pulmonary effort is normal. No respiratory distress. Breath sounds: Normal breath sounds. No decreased breath sounds, wheezing, rhonchi or rales. Chest:      Chest wall: No tenderness. Abdominal:      General: There is no distension. Palpations: Abdomen is soft. There is no mass. Tenderness: There is no abdominal tenderness. Musculoskeletal:         General: No tenderness or deformity. Normal range of motion. Cervical back: Normal range of motion and neck supple. Lymphadenopathy:      Cervical: No cervical adenopathy. Skin:     General: Skin is warm and dry. Neurological:      Mental Status: He is alert and oriented to person, place, and time. Motor: No abnormal muscle tone. Coordination: Coordination normal.      Gait: Gait normal.   Psychiatric:         Mood and Affect: Mood normal.         Behavior: Behavior normal.         Thought Content: Thought content normal.         Judgment: Judgment normal.          On this date 11/28/2022 I have spent 32 minutes reviewing previous notes, test results and face to face with the patient discussing the diagnosis and importance of compliance with the treatment plan as well as documenting on the day of the visit. An electronic signature was used to authenticate this note.     --Britney Johnson, CHYNA - CNP

## 2022-12-07 ENCOUNTER — OFFICE VISIT (OUTPATIENT)
Dept: FAMILY MEDICINE CLINIC | Age: 47
End: 2022-12-07
Payer: COMMERCIAL

## 2022-12-07 VITALS
OXYGEN SATURATION: 98 % | HEART RATE: 106 BPM | BODY MASS INDEX: 34.17 KG/M2 | DIASTOLIC BLOOD PRESSURE: 84 MMHG | SYSTOLIC BLOOD PRESSURE: 126 MMHG | WEIGHT: 245 LBS | RESPIRATION RATE: 18 BRPM

## 2022-12-07 DIAGNOSIS — F41.9 ANXIETY: ICD-10-CM

## 2022-12-07 DIAGNOSIS — I10 PRIMARY HYPERTENSION: Primary | ICD-10-CM

## 2022-12-07 DIAGNOSIS — Z13.220 SCREENING FOR LIPID DISORDERS: ICD-10-CM

## 2022-12-07 DIAGNOSIS — Z13.1 SCREENING FOR DIABETES MELLITUS: ICD-10-CM

## 2022-12-07 PROCEDURE — 99214 OFFICE O/P EST MOD 30 MIN: CPT | Performed by: NURSE PRACTITIONER

## 2022-12-07 PROCEDURE — 3078F DIAST BP <80 MM HG: CPT | Performed by: NURSE PRACTITIONER

## 2022-12-07 PROCEDURE — 3074F SYST BP LT 130 MM HG: CPT | Performed by: NURSE PRACTITIONER

## 2022-12-07 NOTE — PROGRESS NOTES
Abran Evans (:  1975) is a 50 y.o. male,Established patient, here for evaluation of the following chief complaint(s):  Hypertension and Other (Would like checked for diabetes- family history )         ASSESSMENT/PLAN:  1. Primary hypertension  -     Comprehensive Metabolic Panel; Future  -     CBC with Auto Differential; Future  2. Anxiety  3. Screening for diabetes mellitus  -     Hemoglobin A1C; Future  4. Screening for lipid disorders  -     Lipid Panel; Future    Cont to monitor bp  BP stable today. Cont losartan 25 mg daily  Anxiety doing good on prozac  Cont current 20 mg of prozac  Note for no mandatory over time      Return in about 6 months (around 2023). Subjective   SUBJECTIVE/OBJECTIVE:  HPI    Recently started on losartan for htn. BP doing better. Taking losartan. Denies dizziness. No side effects. Anxiety elevated some. Currently on prozac 20 mg daily. No recent changes. Denies depression. Is having a lot of stress at work. Making him work mandatory over time. Makes his anxiety worse. Review of Systems   Constitutional:  Negative for activity change, appetite change, chills, diaphoresis, fatigue, fever and unexpected weight change. HENT:  Negative for congestion, ear pain, postnasal drip, sinus pressure and sore throat. Eyes:  Negative for visual disturbance. Respiratory:  Negative for cough, chest tightness, shortness of breath, wheezing and stridor. Cardiovascular:  Negative for chest pain, palpitations and leg swelling. Gastrointestinal:  Negative for abdominal distention, abdominal pain, constipation, diarrhea, nausea and vomiting. Endocrine: Negative for polydipsia, polyphagia and polyuria. Genitourinary:  Negative for decreased urine volume, difficulty urinating, dysuria, flank pain, frequency, hematuria and urgency.    Musculoskeletal:  Negative for arthralgias, back pain, gait problem, joint swelling, myalgias and neck pain.   Skin:  Negative for color change, pallor and rash. Neurological:  Negative for dizziness, syncope, weakness, light-headedness, numbness and headaches. Hematological:  Negative for adenopathy. Psychiatric/Behavioral:  Negative for behavioral problems, self-injury and sleep disturbance. The patient is not nervous/anxious. Objective   Physical Exam  Vitals reviewed. Constitutional:       General: He is not in acute distress. Appearance: Normal appearance. He is well-developed. HENT:      Head: Normocephalic. Right Ear: External ear normal.      Left Ear: External ear normal.      Nose: Nose normal.      Right Sinus: No maxillary sinus tenderness. Left Sinus: No maxillary sinus tenderness. Mouth/Throat:      Pharynx: Uvula midline. Neck:      Trachea: Trachea normal.   Cardiovascular:      Rate and Rhythm: Normal rate and regular rhythm. Heart sounds: Normal heart sounds. No murmur heard. Pulmonary:      Effort: Pulmonary effort is normal. No respiratory distress. Breath sounds: Normal breath sounds. No decreased breath sounds, wheezing, rhonchi or rales. Chest:      Chest wall: No tenderness. Abdominal:      General: There is no distension. Palpations: Abdomen is soft. There is no mass. Tenderness: There is no abdominal tenderness. Musculoskeletal:         General: No tenderness or deformity. Normal range of motion. Cervical back: Normal range of motion and neck supple. Lymphadenopathy:      Cervical: No cervical adenopathy. Skin:     General: Skin is warm and dry. Neurological:      Mental Status: He is alert and oriented to person, place, and time. Motor: No abnormal muscle tone. Coordination: Coordination normal.      Gait: Gait normal.   Psychiatric:         Mood and Affect: Mood normal.         Behavior: Behavior normal.         Thought Content:  Thought content normal.         Judgment: Judgment normal.          On this date 12/7/2022 I have spent 35 minutes reviewing previous notes, test results and face to face with the patient discussing the diagnosis and importance of compliance with the treatment plan as well as documenting on the day of the visit. An electronic signature was used to authenticate this note.     --Read CHYNA Mason - CNP

## 2022-12-11 ASSESSMENT — ENCOUNTER SYMPTOMS
STRIDOR: 0
SINUS PRESSURE: 0
ABDOMINAL DISTENTION: 0
CONSTIPATION: 0
DIARRHEA: 0
VOMITING: 0
COLOR CHANGE: 0
SHORTNESS OF BREATH: 0
NAUSEA: 0
WHEEZING: 0
COUGH: 0
CHEST TIGHTNESS: 0
BACK PAIN: 0
SORE THROAT: 0
ABDOMINAL PAIN: 0

## 2022-12-14 ENCOUNTER — HOSPITAL ENCOUNTER (EMERGENCY)
Age: 47
Discharge: HOME | End: 2022-12-14
Payer: COMMERCIAL

## 2022-12-14 VITALS
RESPIRATION RATE: 16 BRPM | TEMPERATURE: 98.3 F | DIASTOLIC BLOOD PRESSURE: 88 MMHG | HEART RATE: 87 BPM | OXYGEN SATURATION: 96 % | SYSTOLIC BLOOD PRESSURE: 115 MMHG

## 2022-12-14 DIAGNOSIS — J06.9 UPPER RESPIRATORY TRACT INFECTION, UNSPECIFIED TYPE: Primary | ICD-10-CM

## 2022-12-14 LAB
FLU A ANTIGEN: NEGATIVE
FLU B ANTIGEN: NEGATIVE

## 2022-12-14 PROCEDURE — 99213 OFFICE O/P EST LOW 20 MIN: CPT | Performed by: NURSE PRACTITIONER

## 2022-12-14 PROCEDURE — 87804 INFLUENZA ASSAY W/OPTIC: CPT

## 2022-12-14 PROCEDURE — 99213 OFFICE O/P EST LOW 20 MIN: CPT

## 2022-12-14 RX ORDER — BENZONATATE 200 MG/1
200 CAPSULE ORAL 3 TIMES DAILY PRN
Qty: 21 CAPSULE | Refills: 0 | Status: SHIPPED | OUTPATIENT
Start: 2022-12-14 | End: 2022-12-21

## 2022-12-14 RX ORDER — PREDNISONE 20 MG/1
40 TABLET ORAL DAILY
Qty: 14 TABLET | Refills: 0 | Status: SHIPPED | OUTPATIENT
Start: 2022-12-14 | End: 2022-12-21

## 2022-12-14 RX ORDER — DEXTROMETHORPHAN HYDROBROMIDE AND PROMETHAZINE HYDROCHLORIDE 15; 6.25 MG/5ML; MG/5ML
5 SYRUP ORAL 4 TIMES DAILY PRN
Qty: 118 ML | Refills: 0 | Status: SHIPPED | OUTPATIENT
Start: 2022-12-14 | End: 2022-12-21

## 2022-12-14 ASSESSMENT — ENCOUNTER SYMPTOMS
COUGH: 1
VOMITING: 0
SORE THROAT: 0
DIARRHEA: 0
SHORTNESS OF BREATH: 0
NAUSEA: 0

## 2022-12-14 NOTE — Clinical Note
Alejandro Alejo was seen and treated in our emergency department on 12/14/2022. He may return to work on 12/16/2022. If you have any questions or concerns, please don't hesitate to call.       Gia Ely, CHYNA - CNP

## 2022-12-14 NOTE — ED PROVIDER NOTES
[facility]  Ur[facility] Encounter       CHIEF COMPLAINT       Chief Complaint   Patient presents with    Cough       Nurses Notes reviewed and I agree except as noted in the HPI. HISTORY OF PRESENT ILLNESS   Ashley Carmona is a 50 y.o. male who presents for evaluation of tight cough, mild congestion and body aches. Patient states that he had similar symptoms 3 weeks ago and was seen by his PCP and placed on oral antibiotics and prednisone. He states that his symptoms seem to improve somewhat and he was feeling better this week. He states that yesterday the symptoms returned and became much more significant. He states that he does work at [employer] and has been exposed to multiple cases of influenza. The history is provided by the patient. REVIEW OF SYSTEMS     Review of Systems   Constitutional:  Negative for chills and fever. HENT:  Positive for congestion. Negative for sore throat. Respiratory:  Positive for cough. Negative for shortness of breath. Cardiovascular:  Negative for chest pain. Gastrointestinal:  Negative for diarrhea, nausea and vomiting. Musculoskeletal:  Positive for myalgias. Negative for arthralgias. Skin:  Negative for rash. Allergic/Immunologic: Negative for immunocompromised state. Neurological:  Negative for headaches. PAST MEDICAL HISTORY         Diagnosis Date    GERD (gastroesophageal reflux disease)     IBS (irritable bowel syndrome)     Primary hypertension 11/28/2022       SURGICALHISTORY     Patient  has a past surgical history that includes back surgery.     CURRENT MEDICATIONS       Previous Medications    ESOMEPRAZOLE (NEXIUM) 40 MG DELAYED RELEASE CAPSULE    Take 1 capsule by mouth every morning (before breakfast)    FLUOXETINE (PROZAC) 20 MG CAPSULE    Take 1 capsule by mouth daily    LOSARTAN (COZAAR) 25 MG TABLET    Take 1 tablet by mouth daily    NYSTATIN (MYCOSTATIN) 287165 UNIT/GM OINTMENT    Apply topically 2 times daily. ALLERGIES     Patient is is allergic to pcn [penicillins]. Patients   Immunization History   Administered Date(s) Administered    COVID-19, MODERNA BLUE border, Primary or Immunocompromised, (age 12y+), IM, 100 mcg/0.5mL 01/14/2021, 02/08/2021, 12/28/2021    COVID-19, [place] Vaccine, Vaccine Unspecified 01/14/2021, 02/08/2021, 12/28/2021       FAMILY HISTORY     Patient's family history includes High Blood Pressure in his brother and mother; Other in his father. SOCIAL HISTORY     Patient  reports that he has never smoked. He has never used smokeless tobacco. He reports that he does not drink alcohol and does not use drugs. PHYSICAL EXAM     ED TRIAGE VITALS  BP: 115/88, Temp: 98.3 °F (36.8 °C), Heart Rate: 87, Resp: 16, SpO2: 96 %,Estimated body mass index is 34.17 kg/m² as calculated from the following:    Height as of 11/28/22: 5' 11\" (1.803 m). Weight as of 12/7/22: 245 lb (111.1 kg). ,No LMP for male patient. Physical Exam  Vitals and nursing note reviewed. Constitutional:       General: He is not in acute distress. Appearance: He is well-developed. He is not diaphoretic. HENT:      Right Ear: Tympanic membrane and ear canal normal.      Left Ear: Tympanic membrane and ear canal normal.      Mouth/Throat:      Mouth: Mucous membranes are moist.      Pharynx: Oropharynx is clear. Eyes:      Conjunctiva/sclera:      Right eye: Right conjunctiva is not injected. Left eye: Left conjunctiva is not injected. Pupils: Pupils are equal.   Cardiovascular:      Rate and Rhythm: Normal rate and regular rhythm. Heart sounds: No murmur heard. Pulmonary:      Effort: Pulmonary effort is normal. No respiratory distress. Breath sounds: Normal breath sounds. Musculoskeletal:      Cervical back: Normal range of motion. Lymphadenopathy:      Head:      Right side of head: No tonsillar adenopathy. Left side of head: No tonsillar adenopathy.       Cervical: No cervical adenopathy. Skin:     General: Skin is warm. Findings: No rash. Neurological:      Mental Status: He is alert and oriented to person, place, and time. Psychiatric:         Behavior: Behavior normal.       DIAGNOSTIC RESULTS     Labs:  Results for orders placed or performed during the hospital encounter of 12/14/22   Rapid influenza A/B antigens   Result Value Ref Range    Flu A Antigen Negative NEGATIVE    Flu B Antigen Negative NEGATIVE       IMAGING:    No orders to display         EKG:      URGENT CARE COURSE:     Vitals:    12/14/22 0820   BP: 115/88   Pulse: 87   Resp: 16   Temp: 98.3 °F (36.8 °C)   TempSrc: Temporal   SpO2: 96%       Medications - No data to display         PROCEDURES:  None    FINAL IMPRESSION      1. Upper respiratory tract infection, unspecified type          DISPOSITION/ PLAN       Influenza testing was negative in the urgent care today. Discussed with the patient we will plan to treat as an upper respiratory infection with prednisone, Tessalon Perles, and Promethazine DM. He is advised to rest and hydrate and follow-up on an outpatient basis if symptoms do not improve or worsen. He is agreeable to plan as discussed. PATIENT REFERRED TO:  CHYNA Vargas CNP  [address] [place] [place]:  New Prescriptions    BENZONATATE (TESSALON) 200 MG CAPSULE    Take 1 capsule by mouth 3 times daily as needed for Cough    PREDNISONE (DELTASONE) 20 MG TABLET    Take 2 tablets by mouth daily for 7 days    PROMETHAZINE-DEXTROMETHORPHAN (PROMETHAZINE-DM) 6.25-15 MG/5ML SYRUP    Take 5 mLs by mouth 4 times daily as needed for Cough Do not drive or operate heavy machinery while taking this medication.        Discontinued Medications    No medications on file       Current Discharge Medication List          Manual CHYNA Cohen CNP    (Please note that portions of this note were completed with a voice recognition program. Efforts were made to edit the dictations but occasionally words are mis-transcribed.)          Lanita Gitelman, CHYNA - CNP  12/14/22 8067

## 2022-12-15 ENCOUNTER — TELEPHONE (OUTPATIENT)
Dept: FAMILY MEDICINE CLINIC | Age: 47
End: 2022-12-15

## 2022-12-21 ENCOUNTER — OFFICE VISIT (OUTPATIENT)
Dept: FAMILY MEDICINE CLINIC | Age: 47
End: 2022-12-21
Payer: COMMERCIAL

## 2022-12-21 ENCOUNTER — TELEPHONE (OUTPATIENT)
Dept: FAMILY MEDICINE CLINIC | Age: 47
End: 2022-12-21

## 2022-12-21 VITALS
BODY MASS INDEX: 34.59 KG/M2 | SYSTOLIC BLOOD PRESSURE: 132 MMHG | TEMPERATURE: 98.1 F | WEIGHT: 248 LBS | RESPIRATION RATE: 18 BRPM | DIASTOLIC BLOOD PRESSURE: 80 MMHG | HEART RATE: 71 BPM | OXYGEN SATURATION: 98 %

## 2022-12-21 DIAGNOSIS — J40 BRONCHITIS: Primary | ICD-10-CM

## 2022-12-21 PROCEDURE — 3078F DIAST BP <80 MM HG: CPT | Performed by: NURSE PRACTITIONER

## 2022-12-21 PROCEDURE — 99213 OFFICE O/P EST LOW 20 MIN: CPT | Performed by: NURSE PRACTITIONER

## 2022-12-21 PROCEDURE — 3074F SYST BP LT 130 MM HG: CPT | Performed by: NURSE PRACTITIONER

## 2022-12-21 RX ORDER — DOXYCYCLINE HYCLATE 100 MG
100 TABLET ORAL 2 TIMES DAILY
Qty: 20 TABLET | Refills: 0 | Status: SHIPPED | OUTPATIENT
Start: 2022-12-21 | End: 2022-12-31

## 2022-12-21 ASSESSMENT — ENCOUNTER SYMPTOMS: COUGH: 1

## 2022-12-21 NOTE — TELEPHONE ENCOUNTER
Pt called stating he is seeing Sonya Najera this afternoon. However he missed work yesterday and would like a letter stating that. He needs this for his employer, stating they have been asking him about it. [facility] once [facility] is [facility]. I advised him that Sonya Najera isn't in the office this morning. He voiced understanding.

## 2022-12-21 NOTE — PROGRESS NOTES
Ashley Carmona (:  1975) is a 50 y.o. male,Established patient, here for evaluation of the following chief complaint(s):  Follow-up (ED follow up 2022. ), Cough, and Fever (/ till 2022 starting to feel better still having a productive cough)         ASSESSMENT/PLAN:  1. Bronchitis  -     doxycycline hyclate (VIBRA-TABS) 100 MG tablet; Take 1 tablet by mouth 2 times daily for 10 days, Disp-20 tablet, R-0Normal    Cont cough med  Doxy as prescribed  Rest  Fluids  tylenol    Return if symptoms worsen or fail to improve. Subjective   SUBJECTIVE/OBJECTIVE:  HPI    Cough and congestion. Was in urgent care and dx with URI. Flu negative. Home covid negative. Was given prednisone and cough syrup. Was running fevers. Still has a cough. Review of Systems   HENT:  Positive for congestion and postnasal drip. Respiratory:  Positive for cough. Objective   Physical Exam  Vitals reviewed. Constitutional:       General: He is not in acute distress. Appearance: Normal appearance. He is well-developed. HENT:      Head: Normocephalic. Right Ear: External ear normal.      Left Ear: External ear normal.      Nose: Congestion present. Right Sinus: No maxillary sinus tenderness. Left Sinus: No maxillary sinus tenderness. Mouth/Throat:      Pharynx: Uvula midline. Neck:      Trachea: Trachea normal.   Cardiovascular:      Rate and Rhythm: Normal rate and regular rhythm. Heart sounds: Normal heart sounds. No murmur heard. Pulmonary:      Effort: Pulmonary effort is normal. No respiratory distress. Breath sounds: Normal breath sounds. No decreased breath sounds, wheezing, rhonchi or rales. Chest:      Chest wall: No tenderness. Abdominal:      General: There is no distension. Palpations: Abdomen is soft. There is no mass. Tenderness: There is no abdominal tenderness.    Musculoskeletal:         General: No tenderness or deformity. Normal range of motion. Cervical back: Normal range of motion and neck supple. Lymphadenopathy:      Cervical: No cervical adenopathy. Skin:     General: Skin is warm and dry. Neurological:      Mental Status: He is alert and oriented to person, place, and time. Motor: No abnormal muscle tone. Coordination: Coordination normal.      Gait: Gait normal.   Psychiatric:         Mood and Affect: Mood normal.         Behavior: Behavior normal.         Thought Content: Thought content normal.         Judgment: Judgment normal.          On this date 12/21/2022 I have spent 23 minutes reviewing previous notes, test results and face to face with the patient discussing the diagnosis and importance of compliance with the treatment plan as well as documenting on the day of the visit. An electronic signature was used to authenticate this note.     --Henna No, CHYNA - CNP

## 2022-12-21 NOTE — LETTER
[facility]  [address] [place] [address]  Phone: [phone]  Fax: [phone]    CHYNA Rome CNP        December 21, 2022     Patient: Delores Pyle   YOB: 1975   Date of Visit: 12/21/2022       To Whom It May Concern: It is my medical opinion that Ann Angel may return to work on 12/21/2022. Off on 12/20/2022. If you have any questions or concerns, please don't hesitate to call.     Sincerely,          CHYNA Rome CNP

## 2022-12-22 ENCOUNTER — TELEPHONE (OUTPATIENT)
Dept: FAMILY MEDICINE CLINIC | Age: 47
End: 2022-12-22

## 2022-12-22 NOTE — TELEPHONE ENCOUNTER
Patient called in FMLA forms were not filled out correctly. Patient stated section 8 and 9 needed more information. For section 8 we needed to have a number of days and hours listed. For section 9 we needed to have PTO marked for yes.          Forms updated, signed by Osorio Hanley and faxed to Miguel A Finch at [phone]

## 2022-12-29 ENCOUNTER — OFFICE VISIT (OUTPATIENT)
Dept: FAMILY MEDICINE CLINIC | Age: 47
End: 2022-12-29
Payer: COMMERCIAL

## 2022-12-29 VITALS
RESPIRATION RATE: 18 BRPM | DIASTOLIC BLOOD PRESSURE: 94 MMHG | BODY MASS INDEX: 34.87 KG/M2 | SYSTOLIC BLOOD PRESSURE: 138 MMHG | WEIGHT: 250 LBS | OXYGEN SATURATION: 98 % | HEART RATE: 115 BPM

## 2022-12-29 DIAGNOSIS — H02.60 XANTHELASMA: Primary | ICD-10-CM

## 2022-12-29 DIAGNOSIS — R06.2 WHEEZING: ICD-10-CM

## 2022-12-29 DIAGNOSIS — J40 BRONCHITIS: ICD-10-CM

## 2022-12-29 PROCEDURE — 99214 OFFICE O/P EST MOD 30 MIN: CPT | Performed by: NURSE PRACTITIONER

## 2022-12-29 PROCEDURE — 3078F DIAST BP <80 MM HG: CPT | Performed by: NURSE PRACTITIONER

## 2022-12-29 PROCEDURE — 3074F SYST BP LT 130 MM HG: CPT | Performed by: NURSE PRACTITIONER

## 2022-12-29 RX ORDER — ALBUTEROL SULFATE 90 UG/1
2 AEROSOL, METERED RESPIRATORY (INHALATION) 4 TIMES DAILY PRN
Qty: 18 G | Refills: 0 | Status: SHIPPED | OUTPATIENT
Start: 2022-12-29

## 2022-12-29 RX ORDER — BENZONATATE 200 MG/1
200 CAPSULE ORAL 3 TIMES DAILY PRN
Qty: 30 CAPSULE | Refills: 0 | Status: SHIPPED | OUTPATIENT
Start: 2022-12-29 | End: 2023-01-08

## 2022-12-29 RX ORDER — LEVOFLOXACIN 500 MG/1
500 TABLET, FILM COATED ORAL DAILY
Qty: 7 TABLET | Refills: 0 | Status: SHIPPED | OUTPATIENT
Start: 2022-12-29 | End: 2023-01-05

## 2022-12-30 ENCOUNTER — NURSE ONLY (OUTPATIENT)
Dept: LAB | Age: 47
End: 2022-12-30

## 2022-12-30 DIAGNOSIS — Z13.220 SCREENING FOR LIPID DISORDERS: ICD-10-CM

## 2022-12-30 DIAGNOSIS — Z13.1 SCREENING FOR DIABETES MELLITUS: ICD-10-CM

## 2022-12-30 DIAGNOSIS — I10 PRIMARY HYPERTENSION: ICD-10-CM

## 2022-12-30 LAB
ALBUMIN SERPL-MCNC: 3.7 G/DL (ref 3.5–5.1)
ALP BLD-CCNC: 110 U/L (ref 38–126)
ALT SERPL-CCNC: 25 U/L (ref 11–66)
ANION GAP SERPL CALCULATED.3IONS-SCNC: 10 MEQ/L (ref 8–16)
AST SERPL-CCNC: 19 U/L (ref 5–40)
AVERAGE GLUCOSE: 339 MG/DL (ref 70–126)
BASOPHILS # BLD: 0.9 %
BASOPHILS ABSOLUTE: 0 THOU/MM3 (ref 0–0.1)
BILIRUB SERPL-MCNC: 0.7 MG/DL (ref 0.3–1.2)
BUN BLDV-MCNC: 10 MG/DL (ref 7–22)
CALCIUM SERPL-MCNC: 9 MG/DL (ref 8.5–10.5)
CHLORIDE BLD-SCNC: 99 MEQ/L (ref 98–111)
CHOLESTEROL, TOTAL: 415 MG/DL (ref 100–199)
CO2: 27 MEQ/L (ref 23–33)
CREAT SERPL-MCNC: 0.7 MG/DL (ref 0.4–1.2)
EOSINOPHIL # BLD: 1.1 %
EOSINOPHILS ABSOLUTE: 0 THOU/MM3 (ref 0–0.4)
ERYTHROCYTE [DISTWIDTH] IN BLOOD BY AUTOMATED COUNT: 13 % (ref 11.5–14.5)
ERYTHROCYTE [DISTWIDTH] IN BLOOD BY AUTOMATED COUNT: 39.8 FL (ref 35–45)
GFR SERPL CREATININE-BSD FRML MDRD: > 60 ML/MIN/1.73M2
GLUCOSE BLD-MCNC: 325 MG/DL (ref 70–108)
HBA1C MFR BLD: 13.3 % (ref 4.4–6.4)
HCT VFR BLD CALC: 41.2 % (ref 42–52)
HDLC SERPL-MCNC: 23 MG/DL
HEMOGLOBIN: 14.2 GM/DL (ref 14–18)
IMMATURE GRANS (ABS): 0.04 THOU/MM3 (ref 0–0.07)
IMMATURE GRANULOCYTES: 0.9 %
LDL CHOLESTEROL CALCULATED: ABNORMAL MG/DL
LYMPHOCYTES # BLD: 30.1 %
LYMPHOCYTES ABSOLUTE: 1.4 THOU/MM3 (ref 1–4.8)
MCH RBC QN AUTO: 29.6 PG (ref 26–33)
MCHC RBC AUTO-ENTMCNC: 34.5 GM/DL (ref 32.2–35.5)
MCV RBC AUTO: 85.8 FL (ref 80–94)
MONOCYTES # BLD: 7.6 %
MONOCYTES ABSOLUTE: 0.3 THOU/MM3 (ref 0.4–1.3)
NUCLEATED RED BLOOD CELLS: 0 /100 WBC
PLATELET # BLD: 196 THOU/MM3 (ref 130–400)
PMV BLD AUTO: 11 FL (ref 9.4–12.4)
POTASSIUM SERPL-SCNC: 4.4 MEQ/L (ref 3.5–5.2)
RBC # BLD: 4.8 MILL/MM3 (ref 4.7–6.1)
SEG NEUTROPHILS: 59.4 %
SEGMENTED NEUTROPHILS ABSOLUTE COUNT: 2.7 THOU/MM3 (ref 1.8–7.7)
SODIUM BLD-SCNC: 136 MEQ/L (ref 135–145)
TOTAL PROTEIN: 5.8 G/DL (ref 6.1–8)
TRIGL SERPL-MCNC: 2226 MG/DL (ref 0–199)
WBC # BLD: 4.5 THOU/MM3 (ref 4.8–10.8)

## 2023-01-01 ASSESSMENT — ENCOUNTER SYMPTOMS: COUGH: 1

## 2023-01-03 ENCOUNTER — OFFICE VISIT (OUTPATIENT)
Dept: FAMILY MEDICINE CLINIC | Age: 48
End: 2023-01-03
Payer: COMMERCIAL

## 2023-01-03 ENCOUNTER — TELEPHONE (OUTPATIENT)
Dept: FAMILY MEDICINE CLINIC | Age: 48
End: 2023-01-03

## 2023-01-03 VITALS
HEART RATE: 83 BPM | SYSTOLIC BLOOD PRESSURE: 110 MMHG | RESPIRATION RATE: 18 BRPM | OXYGEN SATURATION: 94 % | BODY MASS INDEX: 34.87 KG/M2 | WEIGHT: 250 LBS | DIASTOLIC BLOOD PRESSURE: 76 MMHG

## 2023-01-03 DIAGNOSIS — I10 PRIMARY HYPERTENSION: Primary | ICD-10-CM

## 2023-01-03 DIAGNOSIS — E11.65 TYPE 2 DIABETES MELLITUS WITH HYPERGLYCEMIA, WITHOUT LONG-TERM CURRENT USE OF INSULIN (HCC): ICD-10-CM

## 2023-01-03 DIAGNOSIS — K21.9 GASTROESOPHAGEAL REFLUX DISEASE WITHOUT ESOPHAGITIS: ICD-10-CM

## 2023-01-03 DIAGNOSIS — E78.2 MIXED HYPERLIPIDEMIA: ICD-10-CM

## 2023-01-03 DIAGNOSIS — F41.9 ANXIETY: ICD-10-CM

## 2023-01-03 PROCEDURE — 3074F SYST BP LT 130 MM HG: CPT | Performed by: NURSE PRACTITIONER

## 2023-01-03 PROCEDURE — 3078F DIAST BP <80 MM HG: CPT | Performed by: NURSE PRACTITIONER

## 2023-01-03 PROCEDURE — 99214 OFFICE O/P EST MOD 30 MIN: CPT | Performed by: NURSE PRACTITIONER

## 2023-01-03 RX ORDER — FENOFIBRATE 160 MG/1
160 TABLET ORAL DAILY
Qty: 90 TABLET | Refills: 1 | Status: SHIPPED | OUTPATIENT
Start: 2023-01-03

## 2023-01-03 RX ORDER — METFORMIN HYDROCHLORIDE 500 MG/1
2000 TABLET, EXTENDED RELEASE ORAL
Qty: 360 TABLET | Refills: 1 | Status: SHIPPED | OUTPATIENT
Start: 2023-01-03 | End: 2023-04-03

## 2023-01-03 ASSESSMENT — ENCOUNTER SYMPTOMS
SHORTNESS OF BREATH: 0
COLOR CHANGE: 0
NAUSEA: 0
STRIDOR: 0
SINUS PRESSURE: 0
ABDOMINAL PAIN: 0
ABDOMINAL DISTENTION: 0
DIARRHEA: 0
BACK PAIN: 0
CONSTIPATION: 0
CHEST TIGHTNESS: 0
SORE THROAT: 0
WHEEZING: 0
VOMITING: 0
COUGH: 0

## 2023-01-03 ASSESSMENT — PATIENT HEALTH QUESTIONNAIRE - PHQ9
1. LITTLE INTEREST OR PLEASURE IN DOING THINGS: 0
SUM OF ALL RESPONSES TO PHQ QUESTIONS 1-9: 0
2. FEELING DOWN, DEPRESSED OR HOPELESS: 0
SUM OF ALL RESPONSES TO PHQ9 QUESTIONS 1 & 2: 0
SUM OF ALL RESPONSES TO PHQ QUESTIONS 1-9: 0

## 2023-01-03 NOTE — TELEPHONE ENCOUNTER
Patient called in and advised that his sugar is 393. He did eat this morning and has an appointment today. He may come earlier than expected. Discussed with Earnestine Davidson.

## 2023-01-03 NOTE — PROGRESS NOTES
Matilde Marmolejo (:  1975) is a 50 y.o. male,Established patient, here for evaluation of the following chief complaint(s):  Discuss Labs (Brother is diabetic grandmother was diabetic)         ASSESSMENT/PLAN:  1. Primary hypertension  -     fenofibrate (TRIGLIDE) 160 MG tablet; Take 1 tablet by mouth daily, Disp-90 tablet, R-1Normal  2. Type 2 diabetes mellitus with hyperglycemia, without long-term current use of insulin (HCC)  -     metFORMIN (GLUCOPHAGE XR) 500 MG extended release tablet; Take 4 tablets by mouth daily (with breakfast), Disp-360 tablet, R-1Normal  3. Gastroesophageal reflux disease without esophagitis  4. Mixed hyperlipidemia  5. Anxiety    New onset DM  Start Metformin XR 2000 mg. Recommended start with 1000 mg and increase after a couple weeks. Continue prozac for anxiety  Start triglide for cholesterol  Continue htn meds  Nexium as prescribed for gerd. I did have a long discussion regarding diet and diet changes. This includes avoiding any drinks with sugar and any foods high in carbs. He also need to watch his fried fatty foods. Eat more baked foods. I recommended he stick to a strict diet. Return in about 4 weeks (around 2023). Subjective   SUBJECTIVE/OBJECTIVE:  HPI      This is a 47-year-old male who I am seeing today for a follow-up of lab work. Patient does have an A1c of 13.3 and triglycerides of 2226 and a total cholesterol of 415. The patient states he has been drinking lots of soda and sweet teas. He does not watch his diet. He does eat out sometimes. He did have lab work ordered the last several years which she has not done up till this point. He has no history of diabetes in the past but does have some family history. He has been sick recently with URI symptoms. Denies any chest pain or shortness of breath. Denies any abdominal pain. Denies any history of pancreatitis.       Review of Systems   Constitutional:  Negative for activity change, appetite change, chills, diaphoresis, fatigue, fever and unexpected weight change. HENT:  Negative for congestion, ear pain, postnasal drip, sinus pressure and sore throat. Eyes:  Negative for visual disturbance. Respiratory:  Negative for cough, chest tightness, shortness of breath, wheezing and stridor. Cardiovascular:  Negative for chest pain, palpitations and leg swelling. Gastrointestinal:  Negative for abdominal distention, abdominal pain, constipation, diarrhea, nausea and vomiting. Endocrine: Negative for polydipsia, polyphagia and polyuria. Genitourinary:  Negative for decreased urine volume, difficulty urinating, dysuria, flank pain, frequency, hematuria and urgency. Musculoskeletal:  Negative for arthralgias, back pain, gait problem, joint swelling, myalgias and neck pain. Skin:  Negative for color change, pallor and rash. Neurological:  Negative for dizziness, syncope, weakness, light-headedness, numbness and headaches. Hematological:  Negative for adenopathy. Psychiatric/Behavioral:  Negative for behavioral problems, self-injury and sleep disturbance. The patient is not nervous/anxious. Objective   Physical Exam  Vitals reviewed. Constitutional:       General: He is not in acute distress. Appearance: Normal appearance. He is well-developed. HENT:      Head: Normocephalic. Right Ear: External ear normal.      Left Ear: External ear normal.      Nose: Nose normal.      Right Sinus: No maxillary sinus tenderness. Left Sinus: No maxillary sinus tenderness. Mouth/Throat:      Pharynx: Uvula midline. Neck:      Trachea: Trachea normal.   Cardiovascular:      Rate and Rhythm: Normal rate and regular rhythm. Heart sounds: Normal heart sounds. No murmur heard. Pulmonary:      Effort: Pulmonary effort is normal. No respiratory distress. Breath sounds: Normal breath sounds. No decreased breath sounds, wheezing, rhonchi or rales.    Chest: Chest wall: No tenderness. Abdominal:      General: There is no distension. Palpations: Abdomen is soft. There is no mass. Tenderness: There is no abdominal tenderness. Musculoskeletal:         General: No tenderness or deformity. Normal range of motion. Cervical back: Normal range of motion and neck supple. Lymphadenopathy:      Cervical: No cervical adenopathy. Skin:     General: Skin is warm and dry. Neurological:      Mental Status: He is alert and oriented to person, place, and time. Motor: No abnormal muscle tone. Coordination: Coordination normal.      Gait: Gait normal.   Psychiatric:         Mood and Affect: Mood normal.         Behavior: Behavior normal.         Thought Content: Thought content normal.         Judgment: Judgment normal.          On this date 1/3/2023 I have spent 37 minutes reviewing previous notes, test results and face to face with the patient discussing the diagnosis and importance of compliance with the treatment plan as well as documenting on the day of the visit. An electronic signature was used to authenticate this note.     --CHYNA Boyce - CNP

## 2023-01-26 ENCOUNTER — TELEPHONE (OUTPATIENT)
Dept: FAMILY MEDICINE CLINIC | Age: 48
End: 2023-01-26

## 2023-01-26 NOTE — TELEPHONE ENCOUNTER
Yes he can try taking 2 in morning and 2 in evening. If no better we can decrease but will need to add another medication.

## 2023-01-26 NOTE — TELEPHONE ENCOUNTER
Patient has been on metformin 4 tablets daily. For the last 3 weeks he is having terrible bowel problems. Says he has to go about 5x a day. Wondering if he can split it and take 2 in the am and 2 in the PM as he was doing fine with 2 at a time. Please advise.

## 2023-01-31 ENCOUNTER — OFFICE VISIT (OUTPATIENT)
Dept: FAMILY MEDICINE CLINIC | Age: 48
End: 2023-01-31
Payer: COMMERCIAL

## 2023-01-31 VITALS
SYSTOLIC BLOOD PRESSURE: 128 MMHG | OXYGEN SATURATION: 98 % | BODY MASS INDEX: 34.59 KG/M2 | HEART RATE: 76 BPM | WEIGHT: 248 LBS | RESPIRATION RATE: 18 BRPM | DIASTOLIC BLOOD PRESSURE: 88 MMHG

## 2023-01-31 DIAGNOSIS — I10 PRIMARY HYPERTENSION: ICD-10-CM

## 2023-01-31 DIAGNOSIS — E11.65 TYPE 2 DIABETES MELLITUS WITH HYPERGLYCEMIA, WITHOUT LONG-TERM CURRENT USE OF INSULIN (HCC): Primary | ICD-10-CM

## 2023-01-31 DIAGNOSIS — F41.9 ANXIETY: ICD-10-CM

## 2023-01-31 DIAGNOSIS — E78.2 MIXED HYPERLIPIDEMIA: ICD-10-CM

## 2023-01-31 PROCEDURE — 3074F SYST BP LT 130 MM HG: CPT | Performed by: NURSE PRACTITIONER

## 2023-01-31 PROCEDURE — 99214 OFFICE O/P EST MOD 30 MIN: CPT | Performed by: NURSE PRACTITIONER

## 2023-01-31 PROCEDURE — 3078F DIAST BP <80 MM HG: CPT | Performed by: NURSE PRACTITIONER

## 2023-01-31 RX ORDER — TIRZEPATIDE 5 MG/.5ML
5 INJECTION, SOLUTION SUBCUTANEOUS WEEKLY
Qty: 2 ML | Refills: 0 | Status: SHIPPED | OUTPATIENT
Start: 2023-01-31 | End: 2023-01-31 | Stop reason: SDUPTHER

## 2023-01-31 RX ORDER — TIRZEPATIDE 5 MG/.5ML
5 INJECTION, SOLUTION SUBCUTANEOUS WEEKLY
Qty: 2 ML | Refills: 0 | Status: SHIPPED | OUTPATIENT
Start: 2023-01-31 | End: 2023-02-28

## 2023-01-31 ASSESSMENT — ENCOUNTER SYMPTOMS
STRIDOR: 0
BACK PAIN: 0
SORE THROAT: 0
ABDOMINAL PAIN: 0
COLOR CHANGE: 0
SHORTNESS OF BREATH: 0
DIARRHEA: 0
COUGH: 0
CONSTIPATION: 0
ABDOMINAL DISTENTION: 0
SINUS PRESSURE: 0
WHEEZING: 0
VOMITING: 0
CHEST TIGHTNESS: 0
NAUSEA: 0

## 2023-01-31 NOTE — PROGRESS NOTES
Doc Reece (:  1975) is a 50 y.o. male,Established patient, here for evaluation of the following chief complaint(s):  Hypertension (a) and Anxiety         ASSESSMENT/PLAN:  1. Type 2 diabetes mellitus with hyperglycemia, without long-term current use of insulin (HCC)  -     Tirzepatide (MOUNJARO) 5 MG/0.5ML SOPN SC injection; Inject 0.5 mLs into the skin once a week for 28 days, Disp-2 mL, R-0Normal  2. Anxiety  3. Mixed hyperlipidemia  4. Primary hypertension    Add Mounjaro. 2.5 mg sample given for 1 month then increase to 5 mg that a script was sent in. First dose of mounjaro given in office  Decrease metformin to 1000 mg daily due to side effects  Continue losartan for BP and kidney protection  Continue prozac for anxiety, no changes  Triglide for triglycerides, no changes  Nexium for gerd, no changes  Continue to change diet and get exercise    Return in about 2 months (around 3/31/2023). Subjective   SUBJECTIVE/OBJECTIVE:  HPI    This is a 75-year-old male who I am seeing today for follow-up. Patient does have a history of diabetes and hyperlipidemia and GERD and anxiety. This was all recently diagnosed. His last A1c was greater than 13. He was started on metformin at that time. Today comes in stating that he cannot tolerate the metformin very well. He is having a lot of diarrhea and abdominal discomfort. He has tried changing his diet. He has cut out soda and certain foods. He states he does not feel as thirsty all the time. Denies any lows. He is on Triglide for his lipids. This will need to be monitored. His anxiety is doing much better on Prozac 20 mg. Denies any depressed thoughts. He does have problems where he gets very anxious when he works a lot of overtime. He is taking Nexium for his acid reflux. This will likely get better after cutting out soda and changing his diet. His BP is ok today but will need to monitor. No cp or sob.             Review of Systems   Constitutional:  Negative for activity change, appetite change, chills, diaphoresis, fatigue, fever and unexpected weight change. HENT:  Negative for congestion, ear pain, postnasal drip, sinus pressure and sore throat. Eyes:  Negative for visual disturbance. Respiratory:  Negative for cough, chest tightness, shortness of breath, wheezing and stridor. Cardiovascular:  Negative for chest pain, palpitations and leg swelling. Gastrointestinal:  Negative for abdominal distention, abdominal pain, constipation, diarrhea, nausea and vomiting. Endocrine: Negative for polydipsia, polyphagia and polyuria. Genitourinary:  Negative for decreased urine volume, difficulty urinating, dysuria, flank pain, frequency, hematuria and urgency. Musculoskeletal:  Negative for arthralgias, back pain, gait problem, joint swelling, myalgias and neck pain. Skin:  Negative for color change, pallor and rash. Neurological:  Negative for dizziness, syncope, weakness, light-headedness, numbness and headaches. Hematological:  Negative for adenopathy. Psychiatric/Behavioral:  Negative for behavioral problems, self-injury and sleep disturbance. The patient is not nervous/anxious. Objective   Physical Exam  Vitals reviewed. Constitutional:       General: He is not in acute distress. Appearance: Normal appearance. He is well-developed. HENT:      Head: Normocephalic. Right Ear: External ear normal.      Left Ear: External ear normal.      Nose: Nose normal.      Right Sinus: No maxillary sinus tenderness. Left Sinus: No maxillary sinus tenderness. Mouth/Throat:      Mouth: Mucous membranes are moist.      Pharynx: Oropharynx is clear. Uvula midline. No oropharyngeal exudate or posterior oropharyngeal erythema. Neck:      Trachea: Trachea normal.   Cardiovascular:      Rate and Rhythm: Normal rate and regular rhythm. Heart sounds: Normal heart sounds. No murmur heard.   Pulmonary: Effort: Pulmonary effort is normal. No respiratory distress. Breath sounds: Normal breath sounds. No decreased breath sounds, wheezing, rhonchi or rales. Chest:      Chest wall: No tenderness. Abdominal:      General: There is no distension. Palpations: Abdomen is soft. There is no mass. Tenderness: There is no abdominal tenderness. Musculoskeletal:         General: No tenderness or deformity. Normal range of motion. Cervical back: Normal range of motion and neck supple. Lymphadenopathy:      Cervical: No cervical adenopathy. Skin:     General: Skin is warm and dry. Neurological:      Mental Status: He is alert and oriented to person, place, and time. Motor: No abnormal muscle tone. Coordination: Coordination normal.      Gait: Gait normal.   Psychiatric:         Mood and Affect: Mood normal.         Behavior: Behavior normal.         Thought Content: Thought content normal.         Judgment: Judgment normal.          On this date 1/31/2023 I have spent 37 minutes reviewing previous notes, test results and face to face with the patient discussing the diagnosis and importance of compliance with the treatment plan as well as documenting on the day of the visit. An electronic signature was used to authenticate this note.     --CHYNA Moore - CNP

## 2023-02-07 ENCOUNTER — OFFICE VISIT (OUTPATIENT)
Dept: FAMILY MEDICINE CLINIC | Age: 48
End: 2023-02-07
Payer: COMMERCIAL

## 2023-02-07 VITALS
DIASTOLIC BLOOD PRESSURE: 84 MMHG | SYSTOLIC BLOOD PRESSURE: 124 MMHG | RESPIRATION RATE: 18 BRPM | WEIGHT: 245 LBS | OXYGEN SATURATION: 97 % | BODY MASS INDEX: 34.17 KG/M2 | HEART RATE: 94 BPM

## 2023-02-07 DIAGNOSIS — J06.9 VIRAL URI: Primary | ICD-10-CM

## 2023-02-07 PROCEDURE — 99213 OFFICE O/P EST LOW 20 MIN: CPT | Performed by: NURSE PRACTITIONER

## 2023-02-07 PROCEDURE — 3074F SYST BP LT 130 MM HG: CPT | Performed by: NURSE PRACTITIONER

## 2023-02-07 PROCEDURE — 3078F DIAST BP <80 MM HG: CPT | Performed by: NURSE PRACTITIONER

## 2023-02-07 NOTE — PROGRESS NOTES
Moose Sweet (:  1975) is a 50 y.o. male,Established patient, here for evaluation of the following chief complaint(s):  Fever (Friday until yesterday )         ASSESSMENT/PLAN:  1. Viral URI    Fluids  Rest  Otc symptomatic relievers  Tylenol for aches and fevers    Return if symptoms worsen or fail to improve. Subjective   SUBJECTIVE/OBJECTIVE:  HPI    Cough and congestion. Started Friday. Running fevers till yesterday. Was worse but now getting better. No sob. Bad body aches. Review of Systems   Constitutional:  Positive for fever. Negative for activity change, appetite change, chills, diaphoresis, fatigue and unexpected weight change. HENT:  Positive for congestion. Negative for ear pain, postnasal drip, sinus pressure and sore throat. Eyes:  Negative for visual disturbance. Respiratory:  Positive for cough. Negative for chest tightness, shortness of breath, wheezing and stridor. Cardiovascular:  Negative for chest pain, palpitations and leg swelling. Gastrointestinal:  Negative for abdominal distention, abdominal pain, constipation, diarrhea, nausea and vomiting. Endocrine: Negative for polydipsia, polyphagia and polyuria. Genitourinary:  Negative for decreased urine volume, difficulty urinating, dysuria, flank pain, frequency, hematuria and urgency. Musculoskeletal:  Positive for arthralgias and myalgias. Negative for back pain, gait problem, joint swelling and neck pain. Skin:  Negative for color change, pallor and rash. Neurological:  Negative for dizziness, syncope, weakness, light-headedness, numbness and headaches. Hematological:  Negative for adenopathy. Psychiatric/Behavioral:  Negative for behavioral problems, self-injury and sleep disturbance. The patient is not nervous/anxious. Objective   Physical Exam  Vitals reviewed. Constitutional:       General: He is not in acute distress. Appearance: Normal appearance.  He is well-developed. HENT:      Head: Normocephalic. Right Ear: Tympanic membrane and external ear normal.      Left Ear: Tympanic membrane and external ear normal.      Nose: Congestion present. Right Sinus: No maxillary sinus tenderness. Left Sinus: No maxillary sinus tenderness. Mouth/Throat:      Pharynx: Uvula midline. Neck:      Trachea: Trachea normal.   Cardiovascular:      Rate and Rhythm: Normal rate and regular rhythm. Heart sounds: Normal heart sounds. No murmur heard. Pulmonary:      Effort: Pulmonary effort is normal. No respiratory distress. Breath sounds: Normal breath sounds. No decreased breath sounds, wheezing, rhonchi or rales. Chest:      Chest wall: No tenderness. Abdominal:      General: There is no distension. Palpations: Abdomen is soft. There is no mass. Tenderness: There is no abdominal tenderness. Musculoskeletal:         General: No tenderness or deformity. Normal range of motion. Cervical back: Normal range of motion and neck supple. Lymphadenopathy:      Cervical: No cervical adenopathy. Skin:     General: Skin is warm and dry. Neurological:      Mental Status: He is alert and oriented to person, place, and time. Motor: No abnormal muscle tone. Coordination: Coordination normal.      Gait: Gait normal.   Psychiatric:         Mood and Affect: Mood normal.         Behavior: Behavior normal.         Thought Content: Thought content normal.         Judgment: Judgment normal.          On this date 2/7/2023 I have spent 25 minutes reviewing previous notes, test results and face to face with the patient discussing the diagnosis and importance of compliance with the treatment plan as well as documenting on the day of the visit. An electronic signature was used to authenticate this note.     --Satinder Ashraf, CHYNA - CNP

## 2023-02-12 ASSESSMENT — ENCOUNTER SYMPTOMS
SORE THROAT: 0
BACK PAIN: 0
SHORTNESS OF BREATH: 0
CHEST TIGHTNESS: 0
STRIDOR: 0
CONSTIPATION: 0
COUGH: 1
VOMITING: 0
ABDOMINAL DISTENTION: 0
COLOR CHANGE: 0
ABDOMINAL PAIN: 0
NAUSEA: 0
DIARRHEA: 0
SINUS PRESSURE: 0
WHEEZING: 0

## 2023-02-28 ENCOUNTER — OFFICE VISIT (OUTPATIENT)
Dept: FAMILY MEDICINE CLINIC | Age: 48
End: 2023-02-28
Payer: COMMERCIAL

## 2023-02-28 VITALS
DIASTOLIC BLOOD PRESSURE: 88 MMHG | BODY MASS INDEX: 33.19 KG/M2 | HEART RATE: 66 BPM | WEIGHT: 238 LBS | SYSTOLIC BLOOD PRESSURE: 126 MMHG | OXYGEN SATURATION: 99 % | RESPIRATION RATE: 20 BRPM

## 2023-02-28 DIAGNOSIS — E11.65 TYPE 2 DIABETES MELLITUS WITH HYPERGLYCEMIA, WITHOUT LONG-TERM CURRENT USE OF INSULIN (HCC): Primary | ICD-10-CM

## 2023-02-28 DIAGNOSIS — E78.2 MIXED HYPERLIPIDEMIA: ICD-10-CM

## 2023-02-28 DIAGNOSIS — I10 PRIMARY HYPERTENSION: ICD-10-CM

## 2023-02-28 DIAGNOSIS — F41.9 ANXIETY: ICD-10-CM

## 2023-02-28 PROCEDURE — 99214 OFFICE O/P EST MOD 30 MIN: CPT | Performed by: NURSE PRACTITIONER

## 2023-02-28 PROCEDURE — 3078F DIAST BP <80 MM HG: CPT | Performed by: NURSE PRACTITIONER

## 2023-02-28 PROCEDURE — 3074F SYST BP LT 130 MM HG: CPT | Performed by: NURSE PRACTITIONER

## 2023-02-28 RX ORDER — TIRZEPATIDE 7.5 MG/.5ML
7.5 INJECTION, SOLUTION SUBCUTANEOUS WEEKLY
Qty: 2 ML | Refills: 3 | Status: SHIPPED | OUTPATIENT
Start: 2023-02-28

## 2023-02-28 SDOH — ECONOMIC STABILITY: FOOD INSECURITY: WITHIN THE PAST 12 MONTHS, THE FOOD YOU BOUGHT JUST DIDN'T LAST AND YOU DIDN'T HAVE MONEY TO GET MORE.: NEVER TRUE

## 2023-02-28 SDOH — ECONOMIC STABILITY: FOOD INSECURITY: WITHIN THE PAST 12 MONTHS, YOU WORRIED THAT YOUR FOOD WOULD RUN OUT BEFORE YOU GOT MONEY TO BUY MORE.: NEVER TRUE

## 2023-02-28 SDOH — ECONOMIC STABILITY: HOUSING INSECURITY
IN THE LAST 12 MONTHS, WAS THERE A TIME WHEN YOU DID NOT HAVE A STEADY PLACE TO SLEEP OR SLEPT IN A SHELTER (INCLUDING NOW)?: NO

## 2023-02-28 SDOH — ECONOMIC STABILITY: INCOME INSECURITY: HOW HARD IS IT FOR YOU TO PAY FOR THE VERY BASICS LIKE FOOD, HOUSING, MEDICAL CARE, AND HEATING?: NOT HARD AT ALL

## 2023-02-28 NOTE — PROGRESS NOTES
Brandan Marquez (:  1975) is a 50 y.o. male,Established patient, here for evaluation of the following chief complaint(s): Anxiety and Hypertension         ASSESSMENT/PLAN:  1. Type 2 diabetes mellitus with hyperglycemia, without long-term current use of insulin (HCC)  -     Tirzepatide (MOUNJARO) 7.5 MG/0.5ML SOPN SC injection; Inject 0.5 mLs into the skin once a week, Disp-2 mL, R-3Normal  2. Primary hypertension  3. Mixed hyperlipidemia  4. Anxiety    Increase mounjaro to 7.5 mg weekly  Monitor a1c  Lose weight and eat healthy  Avoid carbs  Htn controlled on current meds. No changes  Monitor lipids. Cont triglide and recheck lipids at next visit  Anxiety controlled on prozac. Return in about 3 months (around 2023). Subjective   SUBJECTIVE/OBJECTIVE:  HPI    Currently on 5 mg mounjaro. No problems. Trying to eat healthier and watch diet. Blood pressure has been running good. Checks periodically. No side effects to meds. Denies cp or sob. Anxiety on prozac good. No recent changes. Denies depression. Lost 10 lbs  Wt Readings from Last 3 Encounters:   23 238 lb (108 kg)   23 245 lb (111.1 kg)   23 248 lb (112.5 kg)         Review of Systems   Constitutional:  Negative for activity change, appetite change, chills, diaphoresis, fatigue, fever and unexpected weight change. HENT:  Negative for congestion, ear pain, postnasal drip, sinus pressure and sore throat. Eyes:  Negative for visual disturbance. Respiratory:  Negative for cough, chest tightness, shortness of breath, wheezing and stridor. Cardiovascular:  Negative for chest pain, palpitations and leg swelling. Gastrointestinal:  Negative for abdominal distention, abdominal pain, constipation, diarrhea, nausea and vomiting. Endocrine: Negative for polydipsia, polyphagia and polyuria.    Genitourinary:  Negative for decreased urine volume, difficulty urinating, dysuria, flank pain, frequency, hematuria and urgency. Musculoskeletal:  Negative for arthralgias, back pain, gait problem, joint swelling, myalgias and neck pain. Skin:  Negative for color change, pallor and rash. Neurological:  Negative for dizziness, syncope, weakness, light-headedness, numbness and headaches. Hematological:  Negative for adenopathy. Psychiatric/Behavioral:  Negative for behavioral problems, self-injury and sleep disturbance. The patient is not nervous/anxious. Objective   Physical Exam  Vitals reviewed. Constitutional:       General: He is not in acute distress. Appearance: Normal appearance. He is well-developed. HENT:      Head: Normocephalic. Right Ear: Tympanic membrane and external ear normal.      Left Ear: Tympanic membrane and external ear normal.      Nose: Nose normal.      Right Sinus: No maxillary sinus tenderness. Left Sinus: No maxillary sinus tenderness. Mouth/Throat:      Pharynx: Uvula midline. Neck:      Trachea: Trachea normal.   Cardiovascular:      Rate and Rhythm: Normal rate and regular rhythm. Heart sounds: Normal heart sounds. No murmur heard. Pulmonary:      Effort: Pulmonary effort is normal. No respiratory distress. Breath sounds: Normal breath sounds. No decreased breath sounds, wheezing, rhonchi or rales. Chest:      Chest wall: No tenderness. Abdominal:      General: There is no distension. Palpations: Abdomen is soft. There is no mass. Tenderness: There is no abdominal tenderness. Musculoskeletal:         General: No tenderness or deformity. Normal range of motion. Cervical back: Normal range of motion and neck supple. Lymphadenopathy:      Cervical: No cervical adenopathy. Skin:     General: Skin is warm and dry. Neurological:      Mental Status: He is alert and oriented to person, place, and time. Motor: No abnormal muscle tone.       Coordination: Coordination normal.      Gait: Gait normal. Psychiatric:         Mood and Affect: Mood normal.         Behavior: Behavior normal.         Thought Content: Thought content normal.         Judgment: Judgment normal.          On this date 2/28/2023 I have spent 35 minutes reviewing previous notes, test results and face to face with the patient discussing the diagnosis and importance of compliance with the treatment plan as well as documenting on the day of the visit. An electronic signature was used to authenticate this note.     --CHYNA Curtis - CNP

## 2023-03-05 ASSESSMENT — ENCOUNTER SYMPTOMS
STRIDOR: 0
COLOR CHANGE: 0
NAUSEA: 0
SHORTNESS OF BREATH: 0
CONSTIPATION: 0
CHEST TIGHTNESS: 0
WHEEZING: 0
BACK PAIN: 0
COUGH: 0
SORE THROAT: 0
ABDOMINAL PAIN: 0
ABDOMINAL DISTENTION: 0
DIARRHEA: 0
SINUS PRESSURE: 0
VOMITING: 0

## 2023-03-22 ENCOUNTER — OFFICE VISIT (OUTPATIENT)
Dept: FAMILY MEDICINE CLINIC | Age: 48
End: 2023-03-22
Payer: COMMERCIAL

## 2023-03-22 VITALS
HEART RATE: 67 BPM | DIASTOLIC BLOOD PRESSURE: 88 MMHG | BODY MASS INDEX: 32.22 KG/M2 | OXYGEN SATURATION: 95 % | WEIGHT: 231 LBS | SYSTOLIC BLOOD PRESSURE: 132 MMHG | RESPIRATION RATE: 20 BRPM

## 2023-03-22 DIAGNOSIS — J01.00 ACUTE NON-RECURRENT MAXILLARY SINUSITIS: ICD-10-CM

## 2023-03-22 DIAGNOSIS — I10 PRIMARY HYPERTENSION: Primary | ICD-10-CM

## 2023-03-22 DIAGNOSIS — E11.65 TYPE 2 DIABETES MELLITUS WITH HYPERGLYCEMIA, WITHOUT LONG-TERM CURRENT USE OF INSULIN (HCC): ICD-10-CM

## 2023-03-22 PROCEDURE — 3079F DIAST BP 80-89 MM HG: CPT | Performed by: NURSE PRACTITIONER

## 2023-03-22 PROCEDURE — 99214 OFFICE O/P EST MOD 30 MIN: CPT | Performed by: NURSE PRACTITIONER

## 2023-03-22 PROCEDURE — 3075F SYST BP GE 130 - 139MM HG: CPT | Performed by: NURSE PRACTITIONER

## 2023-03-22 RX ORDER — DOXYCYCLINE HYCLATE 100 MG
100 TABLET ORAL 2 TIMES DAILY
Qty: 20 TABLET | Refills: 0 | Status: SHIPPED | OUTPATIENT
Start: 2023-03-22 | End: 2023-04-01

## 2023-03-22 ASSESSMENT — ENCOUNTER SYMPTOMS
SORE THROAT: 0
COUGH: 1
STRIDOR: 0
BACK PAIN: 0
VOMITING: 0
CONSTIPATION: 0
CHEST TIGHTNESS: 0
WHEEZING: 0
COLOR CHANGE: 0
SHORTNESS OF BREATH: 0
ABDOMINAL DISTENTION: 0
NAUSEA: 0
ABDOMINAL PAIN: 0
DIARRHEA: 0
SINUS PRESSURE: 1

## 2023-03-22 NOTE — PROGRESS NOTES
Violeta Kamara (:  1975) is a 50 y.o. male,Established patient, here for evaluation of the following chief complaint(s):  Congestion (Sinus x 2 months per patient got worse last week) and Otalgia (Ear pressure x 1 month)         ASSESSMENT/PLAN:  1. Primary hypertension  2. Type 2 diabetes mellitus with hyperglycemia, without long-term current use of insulin (HCC)  3. Acute non-recurrent maxillary sinusitis    Restart losartan for htn and kidney protection from DM  Cont to lose weight  Cont mounjaro and metformin for DM. Recheck a1c in a couple weeks  Eat healthy  Fluids  Rest  Doxy as prescribed  Otc mucinex     Return as planned. Subjective   SUBJECTIVE/OBJECTIVE:  HPI    Sinus pressure. Ear pressure. Started several weeks back. Nose with lots of drainage. Pnd. No fevers. Otc meds not helping. No sob. Little cough. Stopped bp med losartan. Thought his bp was low when it was 798 systolic. No cp or sob. No dizziness. Has not taken losartan for 1 1/2 months. DM meds doing good. Lost almost 15 lbs in 1 1/2 months. Trying to watch diet. Wt Readings from Last 3 Encounters:   23 231 lb (104.8 kg)   23 238 lb (108 kg)   23 245 lb (111.1 kg)         Review of Systems   Constitutional:  Positive for fatigue. Negative for activity change, appetite change, chills, diaphoresis, fever and unexpected weight change. HENT:  Positive for congestion, postnasal drip and sinus pressure. Negative for ear pain and sore throat. Eyes:  Negative for visual disturbance. Respiratory:  Positive for cough. Negative for chest tightness, shortness of breath, wheezing and stridor. Cardiovascular:  Negative for chest pain, palpitations and leg swelling. Gastrointestinal:  Negative for abdominal distention, abdominal pain, constipation, diarrhea, nausea and vomiting. Endocrine: Negative for polydipsia, polyphagia and polyuria.    Genitourinary:

## 2023-03-27 DIAGNOSIS — E11.65 TYPE 2 DIABETES MELLITUS WITH HYPERGLYCEMIA, WITHOUT LONG-TERM CURRENT USE OF INSULIN (HCC): ICD-10-CM

## 2023-03-27 RX ORDER — TIRZEPATIDE 7.5 MG/.5ML
7.5 INJECTION, SOLUTION SUBCUTANEOUS WEEKLY
Qty: 2 ML | Refills: 3 | Status: SHIPPED | OUTPATIENT
Start: 2023-03-27

## 2023-03-27 NOTE — TELEPHONE ENCOUNTER
He can call around to other pharmacy's in the area and see if they have the same dose. If he finds it we can send a script to that pharmacy.
Patient called back. [facility] in [facility] [place] [address] [address] has [facility] in [facility].     Last appointment this department: 3/22/2023  Next appointment this department: 4/3/2023
Patient informed. He will call us back with what pharmacy has it in stock.
Patient is due for his Max Mcintosh shot tomorrow and [facility] in [facility] said they cannot get Max Mcintosh back in stock until April. Patient is wanting to know what he should do. Would like c/b at [phone].
no sweating/no chills

## 2023-06-26 ENCOUNTER — OFFICE VISIT (OUTPATIENT)
Dept: FAMILY MEDICINE CLINIC | Age: 48
End: 2023-06-26
Payer: COMMERCIAL

## 2023-06-26 VITALS
RESPIRATION RATE: 18 BRPM | SYSTOLIC BLOOD PRESSURE: 130 MMHG | BODY MASS INDEX: 31.66 KG/M2 | HEART RATE: 66 BPM | OXYGEN SATURATION: 95 % | DIASTOLIC BLOOD PRESSURE: 82 MMHG | WEIGHT: 227 LBS

## 2023-06-26 DIAGNOSIS — E11.9 TYPE 2 DIABETES MELLITUS WITHOUT COMPLICATION, WITHOUT LONG-TERM CURRENT USE OF INSULIN (HCC): ICD-10-CM

## 2023-06-26 DIAGNOSIS — E78.2 MIXED HYPERLIPIDEMIA: ICD-10-CM

## 2023-06-26 DIAGNOSIS — F41.9 ANXIETY: ICD-10-CM

## 2023-06-26 DIAGNOSIS — I10 PRIMARY HYPERTENSION: Primary | ICD-10-CM

## 2023-06-26 PROCEDURE — 99214 OFFICE O/P EST MOD 30 MIN: CPT | Performed by: NURSE PRACTITIONER

## 2023-06-26 PROCEDURE — 3044F HG A1C LEVEL LT 7.0%: CPT | Performed by: NURSE PRACTITIONER

## 2023-06-26 PROCEDURE — 3079F DIAST BP 80-89 MM HG: CPT | Performed by: NURSE PRACTITIONER

## 2023-06-26 PROCEDURE — 3075F SYST BP GE 130 - 139MM HG: CPT | Performed by: NURSE PRACTITIONER

## 2023-06-26 ASSESSMENT — ENCOUNTER SYMPTOMS
COUGH: 0
VOMITING: 0
DIARRHEA: 0
ABDOMINAL DISTENTION: 0
SINUS PRESSURE: 0
STRIDOR: 0
SHORTNESS OF BREATH: 0
NAUSEA: 0
CONSTIPATION: 0
ABDOMINAL PAIN: 0
CHEST TIGHTNESS: 0
SORE THROAT: 0
BACK PAIN: 0
WHEEZING: 0
COLOR CHANGE: 0

## 2023-06-29 RX ORDER — ESOMEPRAZOLE MAGNESIUM 40 MG/1
40 CAPSULE, DELAYED RELEASE ORAL
Qty: 90 CAPSULE | Refills: 3 | Status: SHIPPED | OUTPATIENT
Start: 2023-06-29

## 2023-07-19 DIAGNOSIS — I10 PRIMARY HYPERTENSION: ICD-10-CM

## 2023-07-19 RX ORDER — FENOFIBRATE 160 MG/1
160 TABLET ORAL DAILY
Qty: 90 TABLET | Refills: 1 | Status: SHIPPED | OUTPATIENT
Start: 2023-07-19

## 2023-07-19 RX ORDER — FLUOXETINE HYDROCHLORIDE 20 MG/1
20 CAPSULE ORAL DAILY
Qty: 30 CAPSULE | Refills: 11 | Status: SHIPPED | OUTPATIENT
Start: 2023-07-19

## 2023-07-19 NOTE — TELEPHONE ENCOUNTER
Pt called office today stating he needed refill on his fenofibrate 160 mg. Pt stated he changed pharamcy and will need new script sent for his Fluoxetine 20mg. Pharmacy loaded in chart. 6/26/2023 8/7/2023    Please advise.

## 2023-08-04 ENCOUNTER — NURSE ONLY (OUTPATIENT)
Dept: LAB | Age: 48
End: 2023-08-04

## 2023-08-04 DIAGNOSIS — E11.9 TYPE 2 DIABETES MELLITUS WITHOUT COMPLICATION, WITHOUT LONG-TERM CURRENT USE OF INSULIN (HCC): ICD-10-CM

## 2023-08-04 DIAGNOSIS — E78.2 MIXED HYPERLIPIDEMIA: ICD-10-CM

## 2023-08-04 DIAGNOSIS — I10 PRIMARY HYPERTENSION: ICD-10-CM

## 2023-08-04 LAB
ALBUMIN SERPL BCG-MCNC: 4.1 G/DL (ref 3.5–5.1)
ALP SERPL-CCNC: 68 U/L (ref 38–126)
ALT SERPL W/O P-5'-P-CCNC: 19 U/L (ref 11–66)
ANION GAP SERPL CALC-SCNC: 6 MEQ/L (ref 8–16)
AST SERPL-CCNC: 23 U/L (ref 5–40)
BASOPHILS ABSOLUTE: 0.1 THOU/MM3 (ref 0–0.1)
BASOPHILS NFR BLD AUTO: 1 %
BILIRUB SERPL-MCNC: 0.5 MG/DL (ref 0.3–1.2)
BUN SERPL-MCNC: 12 MG/DL (ref 7–22)
CALCIUM SERPL-MCNC: 9.3 MG/DL (ref 8.5–10.5)
CHLORIDE SERPL-SCNC: 107 MEQ/L (ref 98–111)
CHOLEST SERPL-MCNC: 196 MG/DL (ref 100–199)
CO2 SERPL-SCNC: 29 MEQ/L (ref 23–33)
CREAT SERPL-MCNC: 0.8 MG/DL (ref 0.4–1.2)
DEPRECATED MEAN GLUCOSE BLD GHB EST-ACNC: 117 MG/DL (ref 70–126)
DEPRECATED RDW RBC AUTO: 40.6 FL (ref 35–45)
EOSINOPHIL NFR BLD AUTO: 1.1 %
EOSINOPHILS ABSOLUTE: 0.1 THOU/MM3 (ref 0–0.4)
ERYTHROCYTE [DISTWIDTH] IN BLOOD BY AUTOMATED COUNT: 12.9 % (ref 11.5–14.5)
GFR SERPL CREATININE-BSD FRML MDRD: > 60 ML/MIN/1.73M2
GLUCOSE SERPL-MCNC: 99 MG/DL (ref 70–108)
HBA1C MFR BLD HPLC: 5.9 % (ref 4.4–6.4)
HCT VFR BLD AUTO: 45.2 % (ref 42–52)
HDLC SERPL-MCNC: 41 MG/DL
HGB BLD-MCNC: 14.5 GM/DL (ref 14–18)
IMM GRANULOCYTES # BLD AUTO: 0.02 THOU/MM3 (ref 0–0.07)
IMM GRANULOCYTES NFR BLD AUTO: 0.4 %
LDLC SERPL CALC-MCNC: 130 MG/DL
LYMPHOCYTES ABSOLUTE: 1.5 THOU/MM3 (ref 1–4.8)
LYMPHOCYTES NFR BLD AUTO: 28.8 %
MCH RBC QN AUTO: 28 PG (ref 26–33)
MCHC RBC AUTO-ENTMCNC: 32.1 GM/DL (ref 32.2–35.5)
MCV RBC AUTO: 87.4 FL (ref 80–94)
MONOCYTES ABSOLUTE: 0.4 THOU/MM3 (ref 0.4–1.3)
MONOCYTES NFR BLD AUTO: 7.8 %
NEUTROPHILS NFR BLD AUTO: 60.9 %
NRBC BLD AUTO-RTO: 0 /100 WBC
PLATELET # BLD AUTO: 226 THOU/MM3 (ref 130–400)
PMV BLD AUTO: 11.2 FL (ref 9.4–12.4)
POTASSIUM SERPL-SCNC: 5 MEQ/L (ref 3.5–5.2)
PROT SERPL-MCNC: 6.3 G/DL (ref 6.1–8)
RBC # BLD AUTO: 5.17 MILL/MM3 (ref 4.7–6.1)
SEGMENTED NEUTROPHILS ABSOLUTE COUNT: 3.2 THOU/MM3 (ref 1.8–7.7)
SODIUM SERPL-SCNC: 142 MEQ/L (ref 135–145)
TRIGL SERPL-MCNC: 125 MG/DL (ref 0–199)
TSH SERPL DL<=0.005 MIU/L-ACNC: 4.4 UIU/ML (ref 0.4–4.2)
WBC # BLD AUTO: 5.2 THOU/MM3 (ref 4.8–10.8)

## 2023-08-07 ENCOUNTER — OFFICE VISIT (OUTPATIENT)
Dept: FAMILY MEDICINE CLINIC | Age: 48
End: 2023-08-07
Payer: COMMERCIAL

## 2023-08-07 VITALS
RESPIRATION RATE: 16 BRPM | HEIGHT: 71 IN | SYSTOLIC BLOOD PRESSURE: 116 MMHG | DIASTOLIC BLOOD PRESSURE: 86 MMHG | OXYGEN SATURATION: 95 % | TEMPERATURE: 97.3 F | HEART RATE: 64 BPM | WEIGHT: 229 LBS | BODY MASS INDEX: 32.06 KG/M2

## 2023-08-07 DIAGNOSIS — I10 PRIMARY HYPERTENSION: Primary | ICD-10-CM

## 2023-08-07 DIAGNOSIS — F41.9 ANXIETY: ICD-10-CM

## 2023-08-07 DIAGNOSIS — E11.9 TYPE 2 DIABETES MELLITUS WITHOUT COMPLICATION, WITHOUT LONG-TERM CURRENT USE OF INSULIN (HCC): ICD-10-CM

## 2023-08-07 DIAGNOSIS — E78.2 MIXED HYPERLIPIDEMIA: ICD-10-CM

## 2023-08-07 PROCEDURE — 3044F HG A1C LEVEL LT 7.0%: CPT | Performed by: NURSE PRACTITIONER

## 2023-08-07 PROCEDURE — 3074F SYST BP LT 130 MM HG: CPT | Performed by: NURSE PRACTITIONER

## 2023-08-07 PROCEDURE — 99214 OFFICE O/P EST MOD 30 MIN: CPT | Performed by: NURSE PRACTITIONER

## 2023-08-07 PROCEDURE — 3078F DIAST BP <80 MM HG: CPT | Performed by: NURSE PRACTITIONER

## 2023-08-07 RX ORDER — TIRZEPATIDE 2.5 MG/.5ML
2.5 INJECTION, SOLUTION SUBCUTANEOUS WEEKLY
Qty: 2 ML | Refills: 3 | Status: SHIPPED | OUTPATIENT
Start: 2023-08-07

## 2023-08-07 NOTE — PROGRESS NOTES
Claudia Lesch (:  1975) is a 50 y.o. male,Established patient, here for evaluation of the following chief complaint(s):  Follow-up (6 week f/u HTN, Anxiety)         ASSESSMENT/PLAN:  1. Primary hypertension  2. Type 2 diabetes mellitus without complication, without long-term current use of insulin ([address])  3. Mixed hyperlipidemia  4. Anxiety    A1c 5.9  Decrease mounjaro to 2.5 mg weekly due to side effects  Monitor a1c  Eat healthy and exercise  Htn stable, ok to stop losartan due to some low pressures  Monitor BP regularly  Monitor lipids, cont triglide. Anxiety doing good. On prozac 20 mg.  cont    Return in about 3 months (around 2023). Subjective   SUBJECTIVE/OBJECTIVE:  HPI    A1c good at 5.9.  on mounjaro 5 mg and metformin currently. Has not been eating as healthy. Been working a lot of hours. States mounjaro is causing a lot of side effects. Has GI bloating and cramping and fatigue. Usually for a couple days after taking the medication each week. States cant take it any longer. BP running low so quit losartan. Today BP ok. On triglide for lipids. Recent lipids greatly improved. Wt Readings from Last 3 Encounters:   23 229 lb (103.9 kg)   23 227 lb (103 kg)   23 228 lb (103.4 kg)         Review of Systems   Constitutional:  Positive for fatigue. Negative for activity change, appetite change, chills, diaphoresis, fever and unexpected weight change. HENT:  Negative for congestion, ear pain, postnasal drip, sinus pressure and sore throat. Eyes:  Negative for visual disturbance. Respiratory:  Negative for cough, chest tightness, shortness of breath, wheezing and stridor. Cardiovascular:  Negative for chest pain, palpitations and leg swelling. Gastrointestinal:  Negative for abdominal distention, abdominal pain, constipation, diarrhea, nausea and vomiting.         Bloating   Endocrine: Negative for polydipsia, polyphagia

## 2023-08-08 ASSESSMENT — ENCOUNTER SYMPTOMS
STRIDOR: 0
ABDOMINAL PAIN: 0
ABDOMINAL DISTENTION: 0
BACK PAIN: 0
SHORTNESS OF BREATH: 0
COLOR CHANGE: 0
COUGH: 0
CONSTIPATION: 0
WHEEZING: 0
DIARRHEA: 0
VOMITING: 0
SINUS PRESSURE: 0
SORE THROAT: 0
CHEST TIGHTNESS: 0
NAUSEA: 0

## 2023-08-15 ENCOUNTER — TELEPHONE (OUTPATIENT)
Dept: FAMILY MEDICINE CLINIC | Age: 48
End: 2023-08-15

## 2023-08-15 DIAGNOSIS — E11.9 TYPE 2 DIABETES MELLITUS WITHOUT COMPLICATION, WITHOUT LONG-TERM CURRENT USE OF INSULIN (HCC): Primary | ICD-10-CM

## 2023-08-15 RX ORDER — TIRZEPATIDE 5 MG/.5ML
5 INJECTION, SOLUTION SUBCUTANEOUS WEEKLY
Qty: 2 ML | Refills: 3 | Status: SHIPPED | OUTPATIENT
Start: 2023-08-15

## 2023-08-15 NOTE — TELEPHONE ENCOUNTER
Pt calling in stating that at his recent visit it was decided to decrease his Mounjaro back down to 2.5. Pt states that he has been monitoring his glucose levels ever since the decrease and he has noticed that his readings are starting to creep back up. Pt states that he would like to go back to the 5 and he will deal with the side effects as he isn't feeling very good now with his sugars creeping back up. Pt does have a few 2.5 pens left and is asking if he can just use what he has before moving back up to the 5mg?

## 2023-08-24 DIAGNOSIS — E11.9 TYPE 2 DIABETES MELLITUS WITHOUT COMPLICATION, WITHOUT LONG-TERM CURRENT USE OF INSULIN (HCC): ICD-10-CM

## 2023-08-24 RX ORDER — TIRZEPATIDE 5 MG/.5ML
INJECTION, SOLUTION SUBCUTANEOUS
Qty: 2 ML | Refills: 0 | Status: SHIPPED | OUTPATIENT
Start: 2023-08-24

## 2023-08-24 NOTE — TELEPHONE ENCOUNTER
Patient called and stated Lalo zacarias not have the script for the 5mg. Please resend to pharmacy.     Last appointment this department: 8/7/2023  Next appointment this department: 11/7/2023

## 2023-09-07 RX ORDER — ESOMEPRAZOLE MAGNESIUM 40 MG/1
40 CAPSULE, DELAYED RELEASE ORAL
Qty: 90 CAPSULE | Refills: 3 | Status: SHIPPED | OUTPATIENT
Start: 2023-09-07

## 2023-09-07 NOTE — TELEPHONE ENCOUNTER
This medication refill is regarding a telephone request. Refill requested by patient. Requested Prescriptions     Pending Prescriptions Disp Refills    esomeprazole (NEXIUM) 40 MG delayed release capsule 90 capsule 3     Sig: Take 1 capsule by mouth every morning (before breakfast)       Date of last visit: 8/7/2023   Date of next visit: 11/7/2023  Date of last refill: 6/29/2023  90/3  (Patient states he is no longer using [facility] and would like everything transferred to [facility])    Last Lipid Panel:    Lab Results   Component Value Date/Time    CHOL 196 08/04/2023 07:45 AM    TRIG 125 08/04/2023 07:45 AM    HDL 41 08/04/2023 07:45 AM    LDLCALC 130 08/04/2023 07:45 AM     Last CMP:   Lab Results   Component Value Date     08/04/2023    K 5.0 08/04/2023     08/04/2023    CO2 29 08/04/2023    BUN 12 08/04/2023    CREATININE 0.8 08/04/2023    GLUCOSE 99 08/04/2023    CALCIUM 9.3 08/04/2023    PROT 6.3 08/04/2023    LABALBU 4.1 08/04/2023    BILITOT 0.5 08/04/2023    ALKPHOS 68 08/04/2023    AST 23 08/04/2023    ALT 19 08/04/2023    LABGLOM >60 08/04/2023       Last Thyroid:    Lab Results   Component Value Date    TSH 4.400 (H) 08/04/2023     Last Hemoglobin A1C:    Lab Results   Component Value Date/Time    LABA1C 5.9 08/04/2023 07:45 AM    AVGG 117 08/04/2023 07:45 AM       Rx verified, ordered and set to EP.

## 2023-09-19 ENCOUNTER — OFFICE VISIT (OUTPATIENT)
Dept: FAMILY MEDICINE CLINIC | Age: 48
End: 2023-09-19
Payer: COMMERCIAL

## 2023-09-19 VITALS
DIASTOLIC BLOOD PRESSURE: 88 MMHG | BODY MASS INDEX: 34.16 KG/M2 | OXYGEN SATURATION: 98 % | HEIGHT: 71 IN | HEART RATE: 59 BPM | SYSTOLIC BLOOD PRESSURE: 132 MMHG | WEIGHT: 244 LBS | RESPIRATION RATE: 18 BRPM

## 2023-09-19 DIAGNOSIS — A08.4 VIRAL GASTROENTERITIS: Primary | ICD-10-CM

## 2023-09-19 PROCEDURE — 99213 OFFICE O/P EST LOW 20 MIN: CPT | Performed by: NURSE PRACTITIONER

## 2023-09-19 PROCEDURE — 3074F SYST BP LT 130 MM HG: CPT | Performed by: NURSE PRACTITIONER

## 2023-09-19 PROCEDURE — 3078F DIAST BP <80 MM HG: CPT | Performed by: NURSE PRACTITIONER

## 2023-09-19 NOTE — PROGRESS NOTES
Normal appearance. He is well-developed. HENT:      Head: Normocephalic and atraumatic. Right Ear: Tympanic membrane and external ear normal.      Left Ear: Tympanic membrane and external ear normal.      Nose: Nose normal.      Right Sinus: No maxillary sinus tenderness. Left Sinus: No maxillary sinus tenderness. Mouth/Throat:      Pharynx: Uvula midline. Neck:      Trachea: Trachea normal.   Cardiovascular:      Rate and Rhythm: Normal rate and regular rhythm. Heart sounds: Normal heart sounds. No murmur heard. Pulmonary:      Effort: Pulmonary effort is normal. No respiratory distress. Breath sounds: Normal breath sounds. No decreased breath sounds, wheezing, rhonchi or rales. Chest:      Chest wall: No tenderness. Abdominal:      General: There is no distension. Palpations: Abdomen is soft. There is no mass. Tenderness: There is no abdominal tenderness. Musculoskeletal:         General: No tenderness or deformity. Normal range of motion. Cervical back: Normal range of motion and neck supple. Lymphadenopathy:      Cervical: No cervical adenopathy. Skin:     General: Skin is warm and dry. Neurological:      Mental Status: He is alert and oriented to person, place, and time. Motor: No abnormal muscle tone. Coordination: Coordination normal.      Gait: Gait normal.   Psychiatric:         Mood and Affect: Mood normal.         Behavior: Behavior normal.         Thought Content: Thought content normal.         Judgment: Judgment normal.            On this date 9/19/2023 I have spent 25 minutes reviewing previous notes, test results and face to face with the patient discussing the diagnosis and importance of compliance with the treatment plan as well as documenting on the day of the visit. An electronic signature was used to authenticate this note.     --CHYNA Krishnan - CNP

## 2023-09-23 ASSESSMENT — ENCOUNTER SYMPTOMS
COLOR CHANGE: 0
STRIDOR: 0
SINUS PRESSURE: 0
SHORTNESS OF BREATH: 0
CONSTIPATION: 0
DIARRHEA: 0
SORE THROAT: 0
VOMITING: 0
BACK PAIN: 0
ABDOMINAL PAIN: 0
NAUSEA: 1
WHEEZING: 0
ABDOMINAL DISTENTION: 0
COUGH: 0
CHEST TIGHTNESS: 0

## 2023-11-20 DIAGNOSIS — E11.9 TYPE 2 DIABETES MELLITUS WITHOUT COMPLICATION, WITHOUT LONG-TERM CURRENT USE OF INSULIN (HCC): ICD-10-CM

## 2023-11-20 RX ORDER — TIRZEPATIDE 5 MG/.5ML
INJECTION, SOLUTION SUBCUTANEOUS
Qty: 2 ML | Refills: 0 | Status: SHIPPED | OUTPATIENT
Start: 2023-11-20

## 2024-01-31 DIAGNOSIS — E11.9 TYPE 2 DIABETES MELLITUS WITHOUT COMPLICATION, WITHOUT LONG-TERM CURRENT USE OF INSULIN (HCC): ICD-10-CM

## 2024-01-31 RX ORDER — TIRZEPATIDE 5 MG/.5ML
INJECTION, SOLUTION SUBCUTANEOUS
Qty: 2 ML | Refills: 0 | Status: CANCELLED | OUTPATIENT
Start: 2024-01-31

## 2024-01-31 RX ORDER — TIRZEPATIDE 7.5 MG/.5ML
7.5 INJECTION, SOLUTION SUBCUTANEOUS WEEKLY
Qty: 2 ML | Refills: 2 | Status: SHIPPED | OUTPATIENT
Start: 2024-01-31

## 2024-01-31 NOTE — TELEPHONE ENCOUNTER
We can try increasing the Mounjaro to 7.5 mg again.   Looked like he might of had some GI side effects in the past so we backed off.    What pharmacy would he like it sent to.

## 2024-01-31 NOTE — TELEPHONE ENCOUNTER
Pt will be needing refill on his Mounjaro 5mg and does not know if he needs increase or if you would like him to stay at 5mg. He states he is currently doing well on the 5mg.     8/7/2023  Visit date not found    Please advise.

## 2024-01-31 NOTE — TELEPHONE ENCOUNTER
Pt okay with trying the 7.5 mg dose again. He will call office if he has any GI trouble with medication. He would like this to go to [facility] in [place]. Please advise.

## 2024-02-22 ENCOUNTER — TELEPHONE (OUTPATIENT)
Dept: FAMILY MEDICINE CLINIC | Age: 49
End: 2024-02-22

## 2024-02-22 DIAGNOSIS — E11.9 TYPE 2 DIABETES MELLITUS WITHOUT COMPLICATION, WITHOUT LONG-TERM CURRENT USE OF INSULIN (HCC): Primary | ICD-10-CM

## 2024-02-22 RX ORDER — TIRZEPATIDE 5 MG/.5ML
5 INJECTION, SOLUTION SUBCUTANEOUS WEEKLY
Qty: 2 ML | Refills: 3 | Status: SHIPPED | OUTPATIENT
Start: 2024-02-22

## 2024-02-22 NOTE — TELEPHONE ENCOUNTER
Sent in script for mounjaro 5 mg injections to [facility].   Referral placed to endo per pt request.

## 2024-02-22 NOTE — TELEPHONE ENCOUNTER
Last appointment this department: 9/19/2023  Next appointment this department: Visit date not found    Patient called in stating that he has been taking Mounjaro 7.5 mg for about two weeks and he cannot tolerate this dose. He states he has acid reflux bad. He is asking if he can go  back on the Mounjaro 5 mg? He knows he will not be able to fill another box for at least 2 weeks but is asking for the 5 mg to be called into the pharmacy. He is also asking for a referral to Dr Herrera (endocrinology)

## 2024-02-26 ENCOUNTER — OFFICE VISIT (OUTPATIENT)
Dept: FAMILY MEDICINE CLINIC | Age: 49
End: 2024-02-26
Payer: COMMERCIAL

## 2024-02-26 VITALS
WEIGHT: 253 LBS | BODY MASS INDEX: 35.31 KG/M2 | OXYGEN SATURATION: 97 % | RESPIRATION RATE: 18 BRPM | DIASTOLIC BLOOD PRESSURE: 88 MMHG | HEART RATE: 70 BPM | SYSTOLIC BLOOD PRESSURE: 136 MMHG

## 2024-02-26 DIAGNOSIS — H01.009 ACUTE BLEPHARITIS: ICD-10-CM

## 2024-02-26 DIAGNOSIS — E11.9 TYPE 2 DIABETES MELLITUS WITHOUT COMPLICATION, WITHOUT LONG-TERM CURRENT USE OF INSULIN (HCC): Primary | ICD-10-CM

## 2024-02-26 DIAGNOSIS — E78.2 MIXED HYPERLIPIDEMIA: ICD-10-CM

## 2024-02-26 DIAGNOSIS — I10 PRIMARY HYPERTENSION: ICD-10-CM

## 2024-02-26 DIAGNOSIS — F41.9 ANXIETY: ICD-10-CM

## 2024-02-26 LAB — HBA1C MFR BLD: 6.4 %

## 2024-02-26 PROCEDURE — 3075F SYST BP GE 130 - 139MM HG: CPT | Performed by: NURSE PRACTITIONER

## 2024-02-26 PROCEDURE — 83036 HEMOGLOBIN GLYCOSYLATED A1C: CPT | Performed by: NURSE PRACTITIONER

## 2024-02-26 PROCEDURE — 3079F DIAST BP 80-89 MM HG: CPT | Performed by: NURSE PRACTITIONER

## 2024-02-26 PROCEDURE — 99214 OFFICE O/P EST MOD 30 MIN: CPT | Performed by: NURSE PRACTITIONER

## 2024-02-26 RX ORDER — ERYTHROMYCIN 5 MG/G
OINTMENT OPHTHALMIC
Qty: 3.5 G | Refills: 0 | Status: SHIPPED | OUTPATIENT
Start: 2024-02-26 | End: 2024-03-07

## 2024-02-26 SDOH — ECONOMIC STABILITY: INCOME INSECURITY: HOW HARD IS IT FOR YOU TO PAY FOR THE VERY BASICS LIKE FOOD, HOUSING, MEDICAL CARE, AND HEATING?: NOT HARD AT ALL

## 2024-02-26 SDOH — ECONOMIC STABILITY: FOOD INSECURITY: WITHIN THE PAST 12 MONTHS, YOU WORRIED THAT YOUR FOOD WOULD RUN OUT BEFORE YOU GOT MONEY TO BUY MORE.: NEVER TRUE

## 2024-02-26 SDOH — ECONOMIC STABILITY: FOOD INSECURITY: WITHIN THE PAST 12 MONTHS, THE FOOD YOU BOUGHT JUST DIDN'T LAST AND YOU DIDN'T HAVE MONEY TO GET MORE.: NEVER TRUE

## 2024-02-26 ASSESSMENT — ENCOUNTER SYMPTOMS
ABDOMINAL DISTENTION: 0
EYE REDNESS: 1
VOMITING: 0
CHEST TIGHTNESS: 0
STRIDOR: 0
WHEEZING: 0
SINUS PRESSURE: 0
EYE PAIN: 1
SHORTNESS OF BREATH: 0
DIARRHEA: 0
EYE DISCHARGE: 1
COUGH: 0
CONSTIPATION: 0
NAUSEA: 0
BACK PAIN: 0
ABDOMINAL PAIN: 0
COLOR CHANGE: 0
SORE THROAT: 0

## 2024-02-26 ASSESSMENT — PATIENT HEALTH QUESTIONNAIRE - PHQ9
1. LITTLE INTEREST OR PLEASURE IN DOING THINGS: 0
2. FEELING DOWN, DEPRESSED OR HOPELESS: 0
SUM OF ALL RESPONSES TO PHQ QUESTIONS 1-9: 0
SUM OF ALL RESPONSES TO PHQ9 QUESTIONS 1 & 2: 0
SUM OF ALL RESPONSES TO PHQ QUESTIONS 1-9: 0

## 2024-02-26 NOTE — PROGRESS NOTES
Rodrigo Lopes (:  1975) is a 48 y.o. male,Established patient, here for evaluation of the following chief complaint(s):  Eye Problem (Left eye itching and swelling- noticed it on Saturday to start with )         ASSESSMENT/PLAN:  1. Type 2 diabetes mellitus without complication, without long-term current use of insulin (HCC)  -     TSH; Future  -     Lipid Panel; Future  -     Comprehensive Metabolic Panel; Future  -     CBC with Auto Differential; Future  2. Mixed hyperlipidemia  -     TSH; Future  -     Lipid Panel; Future  3. Primary hypertension  -     TSH; Future  -     Comprehensive Metabolic Panel; Future  -     CBC with Auto Differential; Future  4. Anxiety  5. Acute blepharitis    Erythromycin oint to left eye    Htn stable  Dm stable, cont mounjaro 5 mg and metformin  Eat healthy  Lose weight  Recheck lipids since gaining weight.    Cont fenofibrate for cholesterol  Anxiety stable on prozac.    Return in about 6 months (around 2024).         Subjective   SUBJECTIVE/OBJECTIVE:  Eye Problem   Associated symptoms include an eye discharge and eye redness. Pertinent negatives include no fever, nausea, vomiting or weakness.       Left eye red and swollen.  Started Saturday.   Itchy and painful.   Getting worse today.   No visual problems.    Watering but no matting or drainage.      Hx of dm.   Last A1c 5.9 in August.   Today 6.4 A1c.    On mounjaro.   Can't tolerate 7.5 mg dose.   Does ok with 5 mg dose.      On metformin daily.   Slipping some on eating.    Did gain lots of weight back.       Htn doing good.        Lipids improved on last check.   Triglycerides were over 2000 at one point.   No hx of pancreatitis.     Wt Readings from Last 3 Encounters:   24 114.8 kg (253 lb)   23 110.7 kg (244 lb)   23 103.9 kg (229 lb)         Review of Systems   Constitutional:  Negative for activity change, appetite change, chills, diaphoresis, fatigue, fever and unexpected weight

## 2024-03-25 ENCOUNTER — TELEPHONE (OUTPATIENT)
Dept: FAMILY MEDICINE CLINIC | Age: 49
End: 2024-03-25

## 2024-03-25 DIAGNOSIS — E11.9 TYPE 2 DIABETES MELLITUS WITHOUT COMPLICATION, WITHOUT LONG-TERM CURRENT USE OF INSULIN (HCC): Primary | ICD-10-CM

## 2024-03-25 RX ORDER — SEMAGLUTIDE 1.34 MG/ML
1 INJECTION, SOLUTION SUBCUTANEOUS WEEKLY
Qty: 2 ML | Refills: 1 | Status: SHIPPED | OUTPATIENT
Start: 2024-03-25

## 2024-03-25 NOTE — TELEPHONE ENCOUNTER
Patient is unable to get mounjaro at this time due stock problems. He stated JR had mentioned Ozempic if he had any stock issues. He uses [facility] in [place].

## 2024-05-15 ENCOUNTER — OFFICE VISIT (OUTPATIENT)
Age: 49
End: 2024-05-15
Payer: COMMERCIAL

## 2024-05-15 VITALS
HEART RATE: 72 BPM | SYSTOLIC BLOOD PRESSURE: 122 MMHG | DIASTOLIC BLOOD PRESSURE: 86 MMHG | HEIGHT: 71 IN | WEIGHT: 240.4 LBS | BODY MASS INDEX: 33.65 KG/M2

## 2024-05-15 DIAGNOSIS — E78.2 MIXED HYPERLIPIDEMIA: ICD-10-CM

## 2024-05-15 DIAGNOSIS — E11.65 TYPE 2 DIABETES MELLITUS WITH HYPERGLYCEMIA, WITHOUT LONG-TERM CURRENT USE OF INSULIN (HCC): Primary | ICD-10-CM

## 2024-05-15 PROCEDURE — 3074F SYST BP LT 130 MM HG: CPT | Performed by: INTERNAL MEDICINE

## 2024-05-15 PROCEDURE — 3079F DIAST BP 80-89 MM HG: CPT | Performed by: INTERNAL MEDICINE

## 2024-05-15 PROCEDURE — 99204 OFFICE O/P NEW MOD 45 MIN: CPT | Performed by: INTERNAL MEDICINE

## 2024-05-15 PROCEDURE — 3044F HG A1C LEVEL LT 7.0%: CPT | Performed by: INTERNAL MEDICINE

## 2024-05-15 RX ORDER — SEMAGLUTIDE 1.34 MG/ML
1 INJECTION, SOLUTION SUBCUTANEOUS WEEKLY
Qty: 2 ML | Refills: 5 | Status: SHIPPED | OUTPATIENT
Start: 2024-05-15

## 2024-05-15 RX ORDER — ATORVASTATIN CALCIUM 10 MG/1
10 TABLET, FILM COATED ORAL DAILY
Qty: 30 TABLET | Refills: 3 | Status: SHIPPED | OUTPATIENT
Start: 2024-05-15

## 2024-05-15 NOTE — PROGRESS NOTES
or deformity.  Psychiatry: Alert and oriented ×3.  Making good eye contact.  Affect is normal.     Diabetic foot exam:   Left Foot:   Visual Exam: normal   Pulse DP: 2+ (normal)   Filament test: normal sensation    Right Foot:   Visual Exam: normal   Pulse DP: 2+ (normal)   Filament test: normal sensation       Assessment/Plan:    1. Type 2 diabetes mellitus with hyperglycemia, without long-term current use of insulin (HCC) : Diabetes mellitus is  for the most part controlled based on A1c.  However I do not have data regarding short-term control. .  Continue with current therapy. Please refer to the instructions.  The patient will be given a referral to the nutritionist and diabetes educator for additional training., We also talked about potential benefit of diet., We also talked about potential benefit of exercise., and we discussed medication side effects.  She would not tolerate Mounjaro due to GI side effects.  He is tolerating the current dose of Ozempic fairly well although is having fatigue like the first 2 days after he takes it.  He would like to maintain the current dose for now.       2. Hyperlipidemia: Will initiate statin therapy for cardioprotection.  Patient has had muscle weakness with Tricor in the past.  Therefore we will start on 10 mg of Lipitor and he should report any musculoskeletal complaints as soon as possible.  Check lipids and LFTs in a month.     Orders Placed This Encounter   Procedures    Lipid Panel     Standing Status:   Future     Standing Expiration Date:   8/15/2024    Comprehensive Metabolic Panel     Standing Status:   Future     Standing Expiration Date:   8/15/2024    C-Peptide     Standing Status:   Future     Standing Expiration Date:   8/15/2024    Microalbumin / Creatinine Urine Ratio     Standing Status:   Future     Standing Expiration Date:   8/15/2024    Hemoglobin A1C     Standing Status:   Future     Standing Expiration Date:   8/15/2024    AMB REFERRAL TO DIABETES

## 2024-07-17 ENCOUNTER — TELEPHONE (OUTPATIENT)
Dept: FAMILY MEDICINE CLINIC | Age: 49
End: 2024-07-17

## 2024-07-17 NOTE — TELEPHONE ENCOUNTER
[facility] paper work received to be updated for patient.     Blank forms scanned, attached to this encounter.             Patient stated for question 8 frequency is 5 times per month.       Patient's last appointment was : 2/26/2024  Patient's next appointment is : Visit date not found      Forms placed in JR's tray.

## 2024-07-18 ENCOUNTER — HOSPITAL ENCOUNTER (EMERGENCY)
Age: 49
Discharge: HOME | End: 2024-07-18
Payer: COMMERCIAL

## 2024-07-18 VITALS
TEMPERATURE: 98 F | RESPIRATION RATE: 16 BRPM | SYSTOLIC BLOOD PRESSURE: 108 MMHG | OXYGEN SATURATION: 97 % | DIASTOLIC BLOOD PRESSURE: 75 MMHG | HEART RATE: 92 BPM

## 2024-07-18 DIAGNOSIS — H65.01 NON-RECURRENT ACUTE SEROUS OTITIS MEDIA OF RIGHT EAR: Primary | ICD-10-CM

## 2024-07-18 PROCEDURE — 99213 OFFICE O/P EST LOW 20 MIN: CPT

## 2024-07-18 RX ORDER — PSEUDOEPHEDRINE HCL 30 MG
60 TABLET ORAL EVERY 6 HOURS
Qty: 32 TABLET | Refills: 0 | Status: SHIPPED | OUTPATIENT
Start: 2024-07-18 | End: 2024-07-22

## 2024-07-18 RX ORDER — FLUTICASONE PROPIONATE 50 MCG
2 SPRAY, SUSPENSION (ML) NASAL DAILY
Qty: 16 G | Refills: 0 | Status: SHIPPED | OUTPATIENT
Start: 2024-07-18

## 2024-07-18 ASSESSMENT — ENCOUNTER SYMPTOMS
EYE REDNESS: 0
VOMITING: 0
EYE DISCHARGE: 0
SORE THROAT: 0
COUGH: 0
TROUBLE SWALLOWING: 0
DIARRHEA: 0
SHORTNESS OF BREATH: 0
RHINORRHEA: 0
NAUSEA: 0

## 2024-07-18 ASSESSMENT — PAIN DESCRIPTION - ORIENTATION: ORIENTATION: RIGHT

## 2024-07-18 ASSESSMENT — PAIN SCALES - GENERAL: PAINLEVEL_OUTOF10: 4

## 2024-07-18 ASSESSMENT — PAIN DESCRIPTION - LOCATION: LOCATION: EAR

## 2024-07-18 ASSESSMENT — PAIN - FUNCTIONAL ASSESSMENT: PAIN_FUNCTIONAL_ASSESSMENT: 0-10

## 2024-07-18 NOTE — ED PROVIDER NOTES
[facility] URGENT CARE  Urgent Care Encounter      CHIEF COMPLAINT       Chief Complaint   Patient presents with    Ear Problem       Nurses Notes reviewed and I agree except as noted in the HPI.  HISTORY OF PRESENT ILLNESS   Rodrigo Lopes is a 48 y.o. male who presents urgent care for evaluation of right ear discomfort.  [place]  He reports since then he has had some right ear discomfort.  He reports feels very full and it frequently pops especially when he is talking, chewing, yawning.  Denies any nasal congestion, runny nose, or drainage from ear.    REVIEW OF SYSTEMS     Review of Systems   Constitutional:  Negative for chills, diaphoresis, fatigue and fever.   HENT:  Positive for ear pain. Negative for congestion, rhinorrhea, sore throat and trouble swallowing.    Eyes:  Negative for discharge and redness.   Respiratory:  Negative for cough and shortness of breath.    Cardiovascular:  Negative for chest pain.   Gastrointestinal:  Negative for diarrhea, nausea and vomiting.   Genitourinary:  Negative for decreased urine volume.   Musculoskeletal:  Negative for neck pain and neck stiffness.   Skin:  Negative for rash.   Neurological:  Negative for headaches.   Hematological:  Negative for adenopathy.   Psychiatric/Behavioral:  Negative for sleep disturbance.        PAST MEDICAL HISTORY         Diagnosis Date    GERD (gastroesophageal reflux disease)     IBS (irritable bowel syndrome)     Primary hypertension 11/28/2022    Type 2 diabetes mellitus 6/26/2023       SURGICAL HISTORY     Patient  has a past surgical history that includes back surgery.    CURRENT MEDICATIONS       Previous Medications    ATORVASTATIN (LIPITOR) 10 MG TABLET    Take 1 tablet by mouth daily    ESOMEPRAZOLE (NEXIUM) 40 MG DELAYED RELEASE CAPSULE    Take 1 capsule by mouth every morning (before breakfast)    FENOFIBRATE (TRIGLIDE) 160 MG TABLET    Take 1 tablet by

## 2024-07-18 NOTE — TELEPHONE ENCOUNTER
Pt needs to come in for any [facility] paper work since not seen since February.

## 2024-07-18 NOTE — DISCHARGE INSTRUCTIONS
Prescribe Flonase 2 sprays each nostril daily and pseudoephedrine 60 mg every 6 hours for 4 days to reduce congestion.  May take Tylenol ibuprofen as needed for pain control.    Follow-up with primary care provider in 3 to 5 days if symptoms worsen or fail to improve

## 2024-07-18 NOTE — ED TRIAGE NOTES
[place] and right ear has been popping since, and feels full

## 2024-07-19 NOTE — TELEPHONE ENCOUNTER
Patient called in to office and I assisted in scheduling him an apt to update his FMLA forms with

## 2024-07-22 ENCOUNTER — OFFICE VISIT (OUTPATIENT)
Dept: FAMILY MEDICINE CLINIC | Age: 49
End: 2024-07-22
Payer: COMMERCIAL

## 2024-07-22 VITALS
DIASTOLIC BLOOD PRESSURE: 76 MMHG | HEIGHT: 71 IN | OXYGEN SATURATION: 99 % | HEART RATE: 71 BPM | WEIGHT: 241 LBS | SYSTOLIC BLOOD PRESSURE: 124 MMHG | RESPIRATION RATE: 18 BRPM | BODY MASS INDEX: 33.74 KG/M2

## 2024-07-22 DIAGNOSIS — E78.2 MIXED HYPERLIPIDEMIA: ICD-10-CM

## 2024-07-22 DIAGNOSIS — F41.9 ANXIETY: ICD-10-CM

## 2024-07-22 DIAGNOSIS — E11.9 TYPE 2 DIABETES MELLITUS WITHOUT COMPLICATION, WITHOUT LONG-TERM CURRENT USE OF INSULIN (HCC): Primary | ICD-10-CM

## 2024-07-22 DIAGNOSIS — I10 PRIMARY HYPERTENSION: ICD-10-CM

## 2024-07-22 PROCEDURE — 3074F SYST BP LT 130 MM HG: CPT | Performed by: NURSE PRACTITIONER

## 2024-07-22 PROCEDURE — 3078F DIAST BP <80 MM HG: CPT | Performed by: NURSE PRACTITIONER

## 2024-07-22 PROCEDURE — 3044F HG A1C LEVEL LT 7.0%: CPT | Performed by: NURSE PRACTITIONER

## 2024-07-22 PROCEDURE — 99214 OFFICE O/P EST MOD 30 MIN: CPT | Performed by: NURSE PRACTITIONER

## 2024-07-22 ASSESSMENT — ENCOUNTER SYMPTOMS
CHEST TIGHTNESS: 0
SORE THROAT: 0
WHEEZING: 0
SINUS PRESSURE: 0
BACK PAIN: 0
DIARRHEA: 0
ABDOMINAL PAIN: 0
ABDOMINAL DISTENTION: 0
NAUSEA: 0
VOMITING: 0
STRIDOR: 0
COLOR CHANGE: 0
CONSTIPATION: 0
SHORTNESS OF BREATH: 0
COUGH: 0

## 2024-07-22 NOTE — PROGRESS NOTES
Rodrigo Lopes (:  1975) is a 48 y.o. male,Established patient, here for evaluation of the following chief complaint(s):  Follow-up (FMLA )      Assessment & Plan   1. Type 2 diabetes mellitus without complication, without long-term current use of insulin (HCC)  2. Mixed hyperlipidemia  3. Primary hypertension  4. Anxiety      Ok for FMLA due to anxiety.  No overtime.    Cont prozac for anxiety  Htn controlled  Cont meds for lipids  Dm appears stable, cont with endo    Return in about 6 months (around 2025).       Subjective   HPI    Last A1c 6.4.   on ozempic.   Did not tolerate metformin.  Seeing endo.    Did not tolerate mounjaro.   Not watching diet he states due to long work hours.    On lipitor and triflide for cholesterol.  Tolerating well.  Prozac for anxiety.   Doing good.  Want fmla filled out again for work.   Making him work a lot of overtime.    Review of Systems   Constitutional:  Negative for activity change, appetite change, chills, diaphoresis, fatigue, fever and unexpected weight change.   HENT:  Negative for congestion, ear pain, postnasal drip, sinus pressure and sore throat.    Eyes:  Negative for visual disturbance.   Respiratory:  Negative for cough, chest tightness, shortness of breath, wheezing and stridor.    Cardiovascular:  Negative for chest pain, palpitations and leg swelling.   Gastrointestinal:  Negative for abdominal distention, abdominal pain, constipation, diarrhea, nausea and vomiting.   Endocrine: Negative for polydipsia, polyphagia and polyuria.   Genitourinary:  Negative for decreased urine volume, difficulty urinating, dysuria, flank pain, frequency, hematuria and urgency.   Musculoskeletal:  Negative for arthralgias, back pain, gait problem, joint swelling, myalgias and neck pain.   Skin:  Negative for color change, pallor and rash.   Neurological:  Negative for dizziness, syncope, weakness, light-headedness, numbness and headaches.   Hematological:

## 2024-07-30 ENCOUNTER — TELEPHONE (OUTPATIENT)
Dept: FAMILY MEDICINE CLINIC | Age: 49
End: 2024-07-30

## 2024-07-30 NOTE — TELEPHONE ENCOUNTER
Patient dropped off FMLA form that needs filled out. JR did just fill out FMLA for him, but it was an old form so his employer needs the updated one filled out. Blank form scanned into chart and in 's folder.

## 2024-08-07 RX ORDER — ESOMEPRAZOLE MAGNESIUM 40 MG/1
40 CAPSULE, DELAYED RELEASE ORAL
Qty: 90 CAPSULE | Refills: 3 | Status: SHIPPED | OUTPATIENT
Start: 2024-08-07

## 2024-08-07 NOTE — TELEPHONE ENCOUNTER
Patient's last appointment was : 7/22/2024  Patient's next appointment is : Visit date not found  Last refilled: 9/7/23

## 2024-08-14 RX ORDER — FLUOXETINE HYDROCHLORIDE 20 MG/1
20 CAPSULE ORAL DAILY
Qty: 30 CAPSULE | Refills: 11 | Status: SHIPPED | OUTPATIENT
Start: 2024-08-14

## 2024-08-14 NOTE — TELEPHONE ENCOUNTER
Last appointment this department: 7/22/2024  Next appointment this department: Visit date not found

## 2024-09-08 ENCOUNTER — HOSPITAL ENCOUNTER (EMERGENCY)
Age: 49
Discharge: HOME | End: 2024-09-08
Payer: COMMERCIAL

## 2024-09-08 VITALS
WEIGHT: 228 LBS | HEIGHT: 71 IN | OXYGEN SATURATION: 97 % | HEART RATE: 76 BPM | BODY MASS INDEX: 31.92 KG/M2 | TEMPERATURE: 97 F | DIASTOLIC BLOOD PRESSURE: 84 MMHG | SYSTOLIC BLOOD PRESSURE: 128 MMHG | RESPIRATION RATE: 16 BRPM

## 2024-09-08 DIAGNOSIS — J06.9 VIRAL URI: Primary | ICD-10-CM

## 2024-09-08 PROCEDURE — 99213 OFFICE O/P EST LOW 20 MIN: CPT

## 2024-09-08 ASSESSMENT — PAIN DESCRIPTION - LOCATION: LOCATION: HEAD

## 2024-09-08 ASSESSMENT — PAIN DESCRIPTION - PAIN TYPE: TYPE: ACUTE PAIN

## 2024-09-08 ASSESSMENT — PAIN SCALES - GENERAL: PAINLEVEL_OUTOF10: 2

## 2024-09-08 ASSESSMENT — PAIN - FUNCTIONAL ASSESSMENT: PAIN_FUNCTIONAL_ASSESSMENT: 0-10

## 2024-09-08 ASSESSMENT — PAIN DESCRIPTION - DESCRIPTORS: DESCRIPTORS: ACHING

## 2024-09-11 ENCOUNTER — HOSPITAL ENCOUNTER (EMERGENCY)
Age: 49
Discharge: HOME | End: 2024-09-11
Payer: COMMERCIAL

## 2024-09-11 VITALS
OXYGEN SATURATION: 96 % | BODY MASS INDEX: 31.92 KG/M2 | HEIGHT: 71 IN | TEMPERATURE: 97.6 F | RESPIRATION RATE: 16 BRPM | SYSTOLIC BLOOD PRESSURE: 126 MMHG | WEIGHT: 228 LBS | DIASTOLIC BLOOD PRESSURE: 92 MMHG | HEART RATE: 81 BPM

## 2024-09-11 DIAGNOSIS — U07.1 COVID-19: Primary | ICD-10-CM

## 2024-09-11 LAB — SARS-COV-2 RDRP RESP QL NAA+PROBE: DETECTED

## 2024-09-11 PROCEDURE — 99213 OFFICE O/P EST LOW 20 MIN: CPT

## 2024-09-11 PROCEDURE — 87635 SARS-COV-2 COVID-19 AMP PRB: CPT

## 2024-09-11 ASSESSMENT — ENCOUNTER SYMPTOMS
WHEEZING: 0
ABDOMINAL PAIN: 0
SINUS PRESSURE: 1
SHORTNESS OF BREATH: 0
COUGH: 0

## 2024-09-11 ASSESSMENT — PAIN - FUNCTIONAL ASSESSMENT
PAIN_FUNCTIONAL_ASSESSMENT: NONE - DENIES PAIN
PAIN_FUNCTIONAL_ASSESSMENT: NONE - DENIES PAIN

## 2024-09-12 ENCOUNTER — CARE COORDINATION (OUTPATIENT)
Dept: CARE COORDINATION | Age: 49
End: 2024-09-12

## 2024-09-13 ENCOUNTER — CARE COORDINATION (OUTPATIENT)
Dept: CARE COORDINATION | Age: 49
End: 2024-09-13

## 2024-11-14 DIAGNOSIS — E11.65 TYPE 2 DIABETES MELLITUS WITH HYPERGLYCEMIA, WITHOUT LONG-TERM CURRENT USE OF INSULIN (HCC): ICD-10-CM

## 2024-11-14 RX ORDER — SEMAGLUTIDE 1.34 MG/ML
1 INJECTION, SOLUTION SUBCUTANEOUS WEEKLY
Qty: 3 ML | Refills: 0 | OUTPATIENT
Start: 2024-11-14

## 2024-11-19 DIAGNOSIS — E11.65 TYPE 2 DIABETES MELLITUS WITH HYPERGLYCEMIA, WITHOUT LONG-TERM CURRENT USE OF INSULIN (HCC): ICD-10-CM

## 2024-11-19 RX ORDER — SEMAGLUTIDE 1.34 MG/ML
1 INJECTION, SOLUTION SUBCUTANEOUS WEEKLY
Qty: 2 ML | Refills: 5 | OUTPATIENT
Start: 2024-11-19

## 2024-11-25 ENCOUNTER — PATIENT MESSAGE (OUTPATIENT)
Dept: FAMILY MEDICINE CLINIC | Age: 49
End: 2024-11-25

## 2024-11-25 DIAGNOSIS — E11.65 TYPE 2 DIABETES MELLITUS WITH HYPERGLYCEMIA, WITHOUT LONG-TERM CURRENT USE OF INSULIN (HCC): ICD-10-CM

## 2024-11-25 RX ORDER — SEMAGLUTIDE 1.34 MG/ML
1 INJECTION, SOLUTION SUBCUTANEOUS WEEKLY
Qty: 2 ML | Refills: 5 | Status: SHIPPED | OUTPATIENT
Start: 2024-11-25

## 2024-11-25 NOTE — TELEPHONE ENCOUNTER
This medication refill is regarding a electronic request.  Refill requested by patient.    Requested Prescriptions     Pending Prescriptions Disp Refills    Semaglutide, 1 MG/DOSE, (OZEMPIC, 1 MG/DOSE,) 4 MG/3ML SOPN sc injection 2 mL 5     Sig: Inject 1 mg into the skin once a week       Date of last visit: 7/22/2024  Date of next visit: Visit date not found  Date of last refill:   Pharmacy Name:     Last Lipid Panel:    Lab Results   Component Value Date/Time    CHOL 196 08/04/2023 07:45 AM    TRIG 125 08/04/2023 07:45 AM    HDL 41 08/04/2023 07:45 AM     Last CMP:   Lab Results   Component Value Date     08/04/2023    K 5.0 08/04/2023     08/04/2023    CO2 29 08/04/2023    BUN 12 08/04/2023    CREATININE 0.8 08/04/2023    GLUCOSE 99 08/04/2023    CALCIUM 9.3 08/04/2023    BILITOT 0.5 08/04/2023    ALKPHOS 68 08/04/2023    AST 23 08/04/2023    ALT 19 08/04/2023    LABGLOM >60 08/04/2023       Last Thyroid:    Lab Results   Component Value Date    TSH 4.400 (H) 08/04/2023     Last Hemoglobin A1C:    Lab Results   Component Value Date/Time    LABA1C 6.4 02/26/2024 10:18 AM       Rx verified, ordered and set to EP.

## 2025-02-10 ENCOUNTER — OFFICE VISIT (OUTPATIENT)
Dept: FAMILY MEDICINE CLINIC | Age: 50
End: 2025-02-10

## 2025-02-10 VITALS
RESPIRATION RATE: 16 BRPM | SYSTOLIC BLOOD PRESSURE: 146 MMHG | BODY MASS INDEX: 35.84 KG/M2 | HEART RATE: 79 BPM | HEIGHT: 71 IN | WEIGHT: 256 LBS | DIASTOLIC BLOOD PRESSURE: 102 MMHG | TEMPERATURE: 98.6 F | OXYGEN SATURATION: 97 %

## 2025-02-10 DIAGNOSIS — E11.65 TYPE 2 DIABETES MELLITUS WITH HYPERGLYCEMIA, WITHOUT LONG-TERM CURRENT USE OF INSULIN (HCC): ICD-10-CM

## 2025-02-10 DIAGNOSIS — F41.9 ANXIETY: ICD-10-CM

## 2025-02-10 DIAGNOSIS — I10 PRIMARY HYPERTENSION: ICD-10-CM

## 2025-02-10 DIAGNOSIS — Z13.29 SCREENING FOR THYROID DISORDER: ICD-10-CM

## 2025-02-10 DIAGNOSIS — E78.2 MIXED HYPERLIPIDEMIA: ICD-10-CM

## 2025-02-10 DIAGNOSIS — E11.9 TYPE 2 DIABETES MELLITUS WITHOUT COMPLICATION, WITHOUT LONG-TERM CURRENT USE OF INSULIN (HCC): Primary | ICD-10-CM

## 2025-02-10 LAB
CREAT UR-MCNC: 204.4 MG/DL
HBA1C MFR BLD: 6.5 %
MICROALBUMIN UR-MCNC: 1.73 MG/DL
MICROALBUMIN/CREAT RATIO PNL UR: 8 MG/G (ref 0–30)

## 2025-02-10 RX ORDER — ATORVASTATIN CALCIUM 10 MG/1
10 TABLET, FILM COATED ORAL DAILY
Qty: 30 TABLET | Refills: 3 | Status: SHIPPED | OUTPATIENT
Start: 2025-02-10

## 2025-02-10 RX ORDER — LOSARTAN POTASSIUM 50 MG/1
50 TABLET ORAL DAILY
Qty: 30 TABLET | Refills: 3 | Status: SHIPPED | OUTPATIENT
Start: 2025-02-10

## 2025-02-10 SDOH — ECONOMIC STABILITY: FOOD INSECURITY: WITHIN THE PAST 12 MONTHS, THE FOOD YOU BOUGHT JUST DIDN'T LAST AND YOU DIDN'T HAVE MONEY TO GET MORE.: NEVER TRUE

## 2025-02-10 SDOH — ECONOMIC STABILITY: FOOD INSECURITY: WITHIN THE PAST 12 MONTHS, YOU WORRIED THAT YOUR FOOD WOULD RUN OUT BEFORE YOU GOT MONEY TO BUY MORE.: NEVER TRUE

## 2025-02-10 ASSESSMENT — ENCOUNTER SYMPTOMS
SINUS PRESSURE: 0
COLOR CHANGE: 0
CONSTIPATION: 0
DIARRHEA: 0
ABDOMINAL DISTENTION: 0
COUGH: 0
STRIDOR: 0
VOMITING: 0
NAUSEA: 0
ABDOMINAL PAIN: 0
WHEEZING: 0
BACK PAIN: 0
SHORTNESS OF BREATH: 0
SORE THROAT: 0
CHEST TIGHTNESS: 0

## 2025-02-10 ASSESSMENT — PATIENT HEALTH QUESTIONNAIRE - PHQ9
SUM OF ALL RESPONSES TO PHQ QUESTIONS 1-9: 0
SUM OF ALL RESPONSES TO PHQ9 QUESTIONS 1 & 2: 0
SUM OF ALL RESPONSES TO PHQ QUESTIONS 1-9: 0
1. LITTLE INTEREST OR PLEASURE IN DOING THINGS: NOT AT ALL
2. FEELING DOWN, DEPRESSED OR HOPELESS: NOT AT ALL
SUM OF ALL RESPONSES TO PHQ QUESTIONS 1-9: 0
SUM OF ALL RESPONSES TO PHQ QUESTIONS 1-9: 0

## 2025-02-10 NOTE — PROGRESS NOTES
Rodrigo Lopes (:  1975) is a 49 y.o. male, Established patient, here for evaluation of the following chief complaint(s):  Hypertension (Feeling heart racing, Noticed on Thursday his BP was high. Went to  on Friday because it was 160/90... Checked it today and it was 149/101. Gets dizzy/ lightheaded when it goes up. )         Assessment & Plan  1. Elevated blood pressure.  His blood pressure has been consistently elevated, with readings such as 138/90, 180, 160, and 150. He reports no chest pain or shortness of breath. He will be initiated on a low dose of losartan, which is also kidney protective. He is advised to monitor his blood pressure at home and at work, ensuring he is seated with his feet flat on the ground for 15 minutes prior to each reading. He should avoid taking readings when stressed. A urine sample will be collected today for renal function assessment.    2. Diabetes mellitus.  His A1c level is currently 6.5, which is higher than his previous readings of 5.6. He is currently on Ozempic, which he reports is effective. He will continue his current regimen of Ozempic. He is advised to discuss any potential changes in his Ozempic dosage with Endo during their upcoming appointment. Blood work will be ordered to assess his cholesterol levels, liver function, kidney function, and thyroid function. He is instructed to fast prior to the blood draw.   He was started on losartan for kidney protection and htn.    3. Elevated cholesterol.  His cholesterol levels are elevated, particularly concerning given his diabetes. His triglyceride level was 2200 in , which has since decreased but he has stopped taking his cholesterol medication. He has not been taking his prescribed Lipitor. He is advised to resume his cholesterol medication. Blood work will be ordered to reassess his cholesterol levels.    Follow-up  The patient will follow up in 4 weeks for a recheck of his blood

## 2025-02-19 ENCOUNTER — LAB (OUTPATIENT)
Dept: LAB | Age: 50
End: 2025-02-19

## 2025-02-19 DIAGNOSIS — E11.9 TYPE 2 DIABETES MELLITUS WITHOUT COMPLICATION, WITHOUT LONG-TERM CURRENT USE OF INSULIN (HCC): ICD-10-CM

## 2025-02-19 DIAGNOSIS — Z13.29 SCREENING FOR THYROID DISORDER: ICD-10-CM

## 2025-02-19 DIAGNOSIS — I10 PRIMARY HYPERTENSION: ICD-10-CM

## 2025-02-19 DIAGNOSIS — E78.2 MIXED HYPERLIPIDEMIA: ICD-10-CM

## 2025-02-19 LAB
ALBUMIN SERPL BCG-MCNC: 4.1 G/DL (ref 3.5–5.1)
ALP SERPL-CCNC: 103 U/L (ref 38–126)
ALT SERPL W/O P-5'-P-CCNC: 18 U/L (ref 11–66)
ANION GAP SERPL CALC-SCNC: 14 MEQ/L (ref 8–16)
AST SERPL-CCNC: 16 U/L (ref 5–40)
BASOPHILS ABSOLUTE: 0 THOU/MM3 (ref 0–0.1)
BASOPHILS NFR BLD AUTO: 0.7 %
BILIRUB SERPL-MCNC: 0.7 MG/DL (ref 0.3–1.2)
BUN SERPL-MCNC: 13 MG/DL (ref 7–22)
CALCIUM SERPL-MCNC: 8.9 MG/DL (ref 8.2–9.6)
CHLORIDE SERPL-SCNC: 106 MEQ/L (ref 98–111)
CHOLEST SERPL-MCNC: 153 MG/DL (ref 100–199)
CO2 SERPL-SCNC: 24 MEQ/L (ref 23–33)
CREAT SERPL-MCNC: 0.7 MG/DL (ref 0.4–1.2)
DEPRECATED RDW RBC AUTO: 37.7 FL (ref 35–45)
EOSINOPHIL NFR BLD AUTO: 1.4 %
EOSINOPHILS ABSOLUTE: 0.1 THOU/MM3 (ref 0–0.4)
ERYTHROCYTE [DISTWIDTH] IN BLOOD BY AUTOMATED COUNT: 12.7 % (ref 11.5–14.5)
GFR SERPL CREATININE-BSD FRML MDRD: > 90 ML/MIN/1.73M2
GLUCOSE SERPL-MCNC: 142 MG/DL (ref 70–108)
HCT VFR BLD AUTO: 41.7 % (ref 42–52)
HDLC SERPL-MCNC: 32 MG/DL
HGB BLD-MCNC: 14.1 GM/DL (ref 14–18)
IMM GRANULOCYTES # BLD AUTO: 0.03 THOU/MM3 (ref 0–0.07)
IMM GRANULOCYTES NFR BLD AUTO: 0.5 %
LDLC SERPL CALC-MCNC: 74 MG/DL
LYMPHOCYTES ABSOLUTE: 1.4 THOU/MM3 (ref 1–4.8)
LYMPHOCYTES NFR BLD AUTO: 25 %
MCH RBC QN AUTO: 27.8 PG (ref 26–33)
MCHC RBC AUTO-ENTMCNC: 33.8 GM/DL (ref 32.2–35.5)
MCV RBC AUTO: 82.1 FL (ref 80–94)
MONOCYTES ABSOLUTE: 0.4 THOU/MM3 (ref 0.4–1.3)
MONOCYTES NFR BLD AUTO: 7.6 %
NEUTROPHILS ABSOLUTE: 3.6 THOU/MM3 (ref 1.8–7.7)
NEUTROPHILS NFR BLD AUTO: 64.8 %
NRBC BLD AUTO-RTO: 0 /100 WBC
PLATELET # BLD AUTO: 191 THOU/MM3 (ref 130–400)
PMV BLD AUTO: 10.8 FL (ref 9.4–12.4)
POTASSIUM SERPL-SCNC: 4.2 MEQ/L (ref 3.5–5.2)
PROT SERPL-MCNC: 6 G/DL (ref 6.1–8)
RBC # BLD AUTO: 5.08 MILL/MM3 (ref 4.7–6.1)
SODIUM SERPL-SCNC: 144 MEQ/L (ref 135–145)
TRIGL SERPL-MCNC: 233 MG/DL (ref 0–199)
TSH SERPL DL<=0.005 MIU/L-ACNC: 3.71 UIU/ML (ref 0.4–4.2)
WBC # BLD AUTO: 5.5 THOU/MM3 (ref 4.8–10.8)

## 2025-03-05 ENCOUNTER — OFFICE VISIT (OUTPATIENT)
Age: 50
End: 2025-03-05
Payer: COMMERCIAL

## 2025-03-05 VITALS
WEIGHT: 257.6 LBS | RESPIRATION RATE: 16 BRPM | SYSTOLIC BLOOD PRESSURE: 128 MMHG | HEART RATE: 86 BPM | DIASTOLIC BLOOD PRESSURE: 86 MMHG | HEIGHT: 71 IN | BODY MASS INDEX: 36.06 KG/M2

## 2025-03-05 DIAGNOSIS — E11.65 TYPE 2 DIABETES MELLITUS WITH HYPERGLYCEMIA, WITHOUT LONG-TERM CURRENT USE OF INSULIN (HCC): Primary | ICD-10-CM

## 2025-03-05 PROCEDURE — 3074F SYST BP LT 130 MM HG: CPT

## 2025-03-05 PROCEDURE — 3044F HG A1C LEVEL LT 7.0%: CPT

## 2025-03-05 PROCEDURE — 3079F DIAST BP 80-89 MM HG: CPT

## 2025-03-05 PROCEDURE — 99214 OFFICE O/P EST MOD 30 MIN: CPT

## 2025-03-05 NOTE — PROGRESS NOTES
Diabetes Checklist    Date: 3/5/25   Patient Name: Rodrigo Lopes   YOB: 1975     When were you Diagnosed with Diabetes? Around 2022  Current Treatment? Ozempic   Prior Treatment? Metformin, mounjaro   Diet Plan? none  Exercise Plan? Walking   Diabetes Complications? none    Do you experience any of the following symptoms?   Symptoms Yes No   Tingling or Numbness in Toes  []   [x]    Burning in Feet  []   [x]    Frequent  Urination  []   [x]    Blurry Vision  []   [x]    Open Wounds on Feet  []   [x]      Do you have any other concerns today? None     
Patient resting quietly / right groin site clean soft and dry.  Safe guard secure.  Left wrist with band aid clean and dry.  Neuro intact.  at bedside.  Call light to reach.  Side rails up.   
  contain minor errors which are inherent in voice recognition technology.**  
data to review, he was educated to check glucose at least daily or every other day to give us some data to review. We will increase his ozempic dose and recheck labs in 3 months. We discussed the risks and side effects of ozempic including MTC, Pancreatitis and worsening diabetic retinopathy  - Comprehensive Metabolic Panel; Future  - Hemoglobin A1C; Future  - Albumin/Creatinine Ratio, Urine; Future  - semaglutide, 2 MG/DOSE, (OZEMPIC) 8 MG/3ML SOPN sc injection; Inject 2 mg into the skin every 7 days  Dispense: 3 mL; Refill: 4    Orders Placed This Encounter   Procedures    Comprehensive Metabolic Panel     Standing Status:   Future     Standing Expiration Date:   9/5/2025    Hemoglobin A1C     Standing Status:   Future     Standing Expiration Date:   9/5/2025    Albumin/Creatinine Ratio, Urine     Standing Status:   Future     Standing Expiration Date:   9/5/2025       The risks and benefits of my recommendations, as well as other treatment options were discussed with the patient today. Questions were answered.  Follow up: 6 months and as needed.         Electronically signed by CHYNA Hernandes CNP 3/5/2025 3:42 PM

## 2025-03-05 NOTE — PATIENT INSTRUCTIONS
Please take the new Ozempic Dose after your 1 mg pens are out. Please obtain labs in 3-4 months and we will see you back in 6 months or sooner if anything changes. Please call your eye Dr for an exam and have them fax us your results.

## 2025-03-10 ENCOUNTER — OFFICE VISIT (OUTPATIENT)
Dept: FAMILY MEDICINE CLINIC | Age: 50
End: 2025-03-10
Payer: COMMERCIAL

## 2025-03-10 VITALS
SYSTOLIC BLOOD PRESSURE: 136 MMHG | WEIGHT: 253 LBS | DIASTOLIC BLOOD PRESSURE: 82 MMHG | BODY MASS INDEX: 35.42 KG/M2 | RESPIRATION RATE: 18 BRPM | HEIGHT: 71 IN | OXYGEN SATURATION: 97 % | HEART RATE: 64 BPM

## 2025-03-10 DIAGNOSIS — E78.2 MIXED HYPERLIPIDEMIA: Primary | ICD-10-CM

## 2025-03-10 DIAGNOSIS — I10 PRIMARY HYPERTENSION: ICD-10-CM

## 2025-03-10 DIAGNOSIS — F41.9 ANXIETY: ICD-10-CM

## 2025-03-10 DIAGNOSIS — E11.9 TYPE 2 DIABETES MELLITUS WITHOUT COMPLICATION, WITHOUT LONG-TERM CURRENT USE OF INSULIN (HCC): ICD-10-CM

## 2025-03-10 PROCEDURE — 3075F SYST BP GE 130 - 139MM HG: CPT | Performed by: NURSE PRACTITIONER

## 2025-03-10 PROCEDURE — 3079F DIAST BP 80-89 MM HG: CPT | Performed by: NURSE PRACTITIONER

## 2025-03-10 PROCEDURE — 3044F HG A1C LEVEL LT 7.0%: CPT | Performed by: NURSE PRACTITIONER

## 2025-03-10 PROCEDURE — 99214 OFFICE O/P EST MOD 30 MIN: CPT | Performed by: NURSE PRACTITIONER

## 2025-03-10 ASSESSMENT — ENCOUNTER SYMPTOMS
WHEEZING: 0
ABDOMINAL DISTENTION: 0
SHORTNESS OF BREATH: 0
SINUS PRESSURE: 0
BACK PAIN: 0
CHEST TIGHTNESS: 0
NAUSEA: 0
COLOR CHANGE: 0
COUGH: 0
SORE THROAT: 0
ABDOMINAL PAIN: 0
DIARRHEA: 0
VOMITING: 0
CONSTIPATION: 0
STRIDOR: 0

## 2025-03-10 NOTE — PROGRESS NOTES
Rodrigo Lopes (:  1975) is a 49 y.o. male, Established patient, here for evaluation of the following chief complaint(s):  Diabetes (Follows with diabetic clinic)         Assessment & Plan  1. Blood pressure management.  His blood pressure is well-regulated with the current losartan regimen. He reported readings between 125-135 mmHg, with a recent reading of 128 mmHg at the office. He will continue taking losartan as prescribed.    2. Anxiety and depression.  He is doing well on Prozac.    3. Acid reflux.  He is managing well on Nexium.    Follow-up  The patient will follow up in 6 months.    Results  Laboratory Studies  Triglycerides are 233.  1. Mixed hyperlipidemia  2. Anxiety  3. Type 2 diabetes mellitus without complication, without long-term current use of insulin ([facility])  4. Primary hypertension    Return in about 6 months (around 9/10/2025).       Subjective   History of Present Illness  The patient presents for evaluation of blood pressure management, anxiety and depression, and acid reflux.    He has been adhering to his prescribed losartan regimen for blood pressure management. He reports intermittent monitoring of his blood pressure, with readings consistently falling within the range of 125 to 135. A recent reading taken at his office on Friday was recorded as 128.    He is doing well with his anxiety and depression on Prozac.    His acid reflux is well managed on Nexium.    Supplemental Information  He had seen his nurse practitioner, and she asked him if he was interested in increasing the Ozempic before he asked her to increase it. He took it Saturday night and had no problem. He had some issues with Mounjaro and was not feeling good.    MEDICATIONS  Losartan, Ozempic, Prozac, Nexium.    Review of Systems   Constitutional:  Negative for activity change, appetite change, chills, diaphoresis, fatigue, fever and unexpected weight change.   HENT:  Negative for congestion, ear pain, postnasal

## 2025-04-15 ENCOUNTER — OFFICE VISIT (OUTPATIENT)
Dept: FAMILY MEDICINE CLINIC | Age: 50
End: 2025-04-15

## 2025-04-15 VITALS
OXYGEN SATURATION: 96 % | HEART RATE: 77 BPM | RESPIRATION RATE: 14 BRPM | HEIGHT: 71 IN | DIASTOLIC BLOOD PRESSURE: 78 MMHG | WEIGHT: 257 LBS | TEMPERATURE: 97.2 F | SYSTOLIC BLOOD PRESSURE: 128 MMHG | BODY MASS INDEX: 35.98 KG/M2

## 2025-04-15 DIAGNOSIS — J01.00 ACUTE NON-RECURRENT MAXILLARY SINUSITIS: Primary | ICD-10-CM

## 2025-04-15 DIAGNOSIS — J40 BRONCHITIS: ICD-10-CM

## 2025-04-15 RX ORDER — METHYLPREDNISOLONE 4 MG/1
TABLET ORAL
Qty: 1 KIT | Refills: 0 | Status: SHIPPED | OUTPATIENT
Start: 2025-04-15 | End: 2025-04-21

## 2025-04-15 RX ORDER — DOXYCYCLINE HYCLATE 100 MG
100 TABLET ORAL 2 TIMES DAILY
Qty: 14 TABLET | Refills: 0 | Status: SHIPPED | OUTPATIENT
Start: 2025-04-15 | End: 2025-04-22

## 2025-04-15 ASSESSMENT — ENCOUNTER SYMPTOMS
NAUSEA: 0
DIARRHEA: 0
CONSTIPATION: 0
COLOR CHANGE: 0
COUGH: 1
SINUS PRESSURE: 1
BACK PAIN: 0
CHEST TIGHTNESS: 0
WHEEZING: 0
SHORTNESS OF BREATH: 0
ABDOMINAL PAIN: 0
ABDOMINAL DISTENTION: 0
VOMITING: 0
SORE THROAT: 1
STRIDOR: 0

## 2025-04-15 NOTE — PROGRESS NOTES
Tympanic membrane and external ear normal.      Nose: Congestion present.      Right Sinus: No maxillary sinus tenderness.      Left Sinus: No maxillary sinus tenderness.      Mouth/Throat:      Mouth: Mucous membranes are moist.      Pharynx: Oropharynx is clear. Uvula midline.   Neck:      Trachea: Trachea normal.   Cardiovascular:      Rate and Rhythm: Normal rate and regular rhythm.      Heart sounds: Normal heart sounds. No murmur heard.  Pulmonary:      Effort: Pulmonary effort is normal. No respiratory distress.      Breath sounds: Normal breath sounds. No decreased breath sounds, wheezing, rhonchi or rales.   Chest:      Chest wall: No tenderness.   Abdominal:      General: There is no distension.      Palpations: Abdomen is soft. There is no mass.      Tenderness: There is no abdominal tenderness.   Musculoskeletal:         General: No tenderness or deformity. Normal range of motion.      Cervical back: Normal range of motion and neck supple.   Lymphadenopathy:      Cervical: No cervical adenopathy.   Skin:     General: Skin is warm and dry.   Neurological:      Mental Status: He is alert and oriented to person, place, and time.      Motor: No abnormal muscle tone.      Coordination: Coordination normal.      Gait: Gait normal.   Psychiatric:         Mood and Affect: Mood normal.         Behavior: Behavior normal.         Thought Content: Thought content normal.         Judgment: Judgment normal.              On this date 4/15/2025 I have spent 25 minutes reviewing previous notes, test results and face to face with the patient discussing the diagnosis and importance of compliance with the treatment plan as well as documenting on the day of the visit.    The patient (or guardian, if applicable) and other individuals in attendance with the patient were advised that Artificial Intelligence will be utilized during this visit to record, process the conversation to generate a clinical note and to support

## 2025-05-05 ENCOUNTER — TELEPHONE (OUTPATIENT)
Age: 50
End: 2025-05-05

## 2025-05-05 NOTE — TELEPHONE ENCOUNTER
Patient called in stating that ever since we changed his Ozempic to 2mg his sugars have been higher in the mornings than they usually were when patient was on the 1mg. Patient was at work and couldn't give me BS. I told patient when he gets off to call in and read these off to me or we wouldn't be able to make any changes without these.

## 2025-05-08 NOTE — TELEPHONE ENCOUNTER
Left message on answering machine requesting pt to call back at earliest convenience as we need to see blood sugars.

## 2025-07-09 ENCOUNTER — PATIENT MESSAGE (OUTPATIENT)
Age: 50
End: 2025-07-09

## 2025-07-09 RX ORDER — ESOMEPRAZOLE MAGNESIUM 40 MG/1
40 CAPSULE, DELAYED RELEASE ORAL
Qty: 90 CAPSULE | Refills: 3 | Status: SHIPPED | OUTPATIENT
Start: 2025-07-09

## 2025-07-09 NOTE — TELEPHONE ENCOUNTER
Last visit- 4/15/2025  Next visit- 9/10/2025    Requested Prescriptions     Pending Prescriptions Disp Refills    esomeprazole (NEXIUM) 40 MG delayed release capsule 90 capsule 3     Sig: Take 1 capsule by mouth every morning (before breakfast)

## 2025-07-14 NOTE — TELEPHONE ENCOUNTER
Patient called in and was informed he could go down to 1 mg weekly, he will call in to tell us how he is doing on it.

## 2025-07-23 ENCOUNTER — CLINICAL DOCUMENTATION (OUTPATIENT)
Age: 50
End: 2025-07-23

## 2025-07-23 DIAGNOSIS — E11.65 TYPE 2 DIABETES MELLITUS WITH HYPERGLYCEMIA, WITHOUT LONG-TERM CURRENT USE OF INSULIN (HCC): Primary | ICD-10-CM

## 2025-08-19 ENCOUNTER — PATIENT MESSAGE (OUTPATIENT)
Age: 50
End: 2025-08-19

## 2025-08-27 ENCOUNTER — TELEPHONE (OUTPATIENT)
Dept: FAMILY MEDICINE CLINIC | Age: 50
End: 2025-08-27

## 2025-09-02 ENCOUNTER — TELEPHONE (OUTPATIENT)
Dept: FAMILY MEDICINE CLINIC | Age: 50
End: 2025-09-02

## 2025-09-06 ENCOUNTER — LAB (OUTPATIENT)
Dept: LAB | Age: 50
End: 2025-09-06

## 2025-09-06 DIAGNOSIS — E11.65 TYPE 2 DIABETES MELLITUS WITH HYPERGLYCEMIA, WITHOUT LONG-TERM CURRENT USE OF INSULIN (HCC): ICD-10-CM

## 2025-09-06 LAB
ALBUMIN SERPL BCG-MCNC: 3.9 G/DL (ref 3.4–4.9)
ALP SERPL-CCNC: 81 U/L (ref 40–129)
ALT SERPL W/O P-5'-P-CCNC: 21 U/L (ref 10–50)
ANION GAP SERPL CALC-SCNC: 7 MEQ/L (ref 8–16)
AST SERPL-CCNC: 21 U/L (ref 10–50)
BILIRUB SERPL-MCNC: 0.4 MG/DL (ref 0.3–1.2)
BUN SERPL-MCNC: 10 MG/DL (ref 8–23)
CALCIUM SERPL-MCNC: 9.3 MG/DL (ref 8.5–10.5)
CHLORIDE SERPL-SCNC: 107 MEQ/L (ref 98–111)
CO2 SERPL-SCNC: 28 MEQ/L (ref 22–29)
CREAT SERPL-MCNC: 0.7 MG/DL (ref 0.7–1.2)
CREAT UR-MCNC: 209 MG/DL
DEPRECATED MEAN GLUCOSE BLD GHB EST-ACNC: 126 MG/DL (ref 70–126)
GFR SERPL CREATININE-BSD FRML MDRD: > 90 ML/MIN/1.73M2
GLUCOSE SERPL-MCNC: 122 MG/DL (ref 74–109)
HBA1C MFR BLD HPLC: 6.2 % (ref 4–6)
MICROALBUMIN UR-MCNC: < 2 MG/DL
MICROALBUMIN/CREAT RATIO PNL UR: 10 MG/G (ref 0–30)
POTASSIUM SERPL-SCNC: 4.5 MEQ/L (ref 3.5–5.2)
PROT SERPL-MCNC: 5.9 G/DL (ref 6.4–8.3)
SODIUM SERPL-SCNC: 142 MEQ/L (ref 135–145)